# Patient Record
Sex: FEMALE | Race: WHITE | Employment: FULL TIME | URBAN - METROPOLITAN AREA
[De-identification: names, ages, dates, MRNs, and addresses within clinical notes are randomized per-mention and may not be internally consistent; named-entity substitution may affect disease eponyms.]

---

## 2017-09-14 ENCOUNTER — TELEPHONE (OUTPATIENT)
Dept: ORTHOPEDICS | Facility: CLINIC | Age: 57
End: 2017-09-14

## 2017-09-14 ENCOUNTER — TRANSFERRED RECORDS (OUTPATIENT)
Dept: HEALTH INFORMATION MANAGEMENT | Facility: CLINIC | Age: 57
End: 2017-09-14

## 2017-09-14 NOTE — TELEPHONE ENCOUNTER
Message was left for patient to call back for an earlier appointment. Appointment was offered on 10/4/17 at 4:30pm with Dr. Sanders.

## 2017-10-17 NOTE — TELEPHONE ENCOUNTER
"APPT INFORMATION    Date /Time: 10/30/17 7AM   Reason for Appt: Painful Left TKA   Ref Provider/Clinic: Mile Gunn PA-C   Patient Contact (Y/N) & Call Details: No - pt is referred   Action:  --     RECORDS CLINIC NAME  (\"None\" if no records ) RECEIVED RECS & IMG? Y/N   (may include other helpful notes)   Internal Clinics: [facility] L Knee Arthroplasty 10/31/16 op-note in Epic with Dr. Gregory Tran    [facility] [place]anne [place] PT notes are in [facility]   External Clinics: [facility] Records scanned in [facility]    XR L Knee 4/6/17 in [facility]            "

## 2017-10-30 ENCOUNTER — PRE VISIT (OUTPATIENT)
Dept: ORTHOPEDICS | Facility: CLINIC | Age: 57
End: 2017-10-30

## 2017-11-04 ASSESSMENT — ENCOUNTER SYMPTOMS
MUSCLE WEAKNESS: 1
EYE REDNESS: 0
MUSCLE CRAMPS: 0
BACK PAIN: 0
EYE PAIN: 0
DOUBLE VISION: 0
NECK PAIN: 0
EYE WATERING: 0
ARTHRALGIAS: 1
STIFFNESS: 0
EYE IRRITATION: 0
MYALGIAS: 0

## 2017-11-10 ASSESSMENT — KOOS JR: HOW SEVERE IS YOUR KNEE STIFFNESS AFTER FIRST WAKING IN MORNING: MODERATE

## 2017-11-10 ASSESSMENT — ACTIVITIES OF DAILY LIVING (ADL): FUNCTION,_DAILY_LIVING_SCORE: 64.7

## 2017-11-11 ENCOUNTER — HEALTH MAINTENANCE LETTER (OUTPATIENT)
Age: 57
End: 2017-11-11

## 2017-11-13 ENCOUNTER — OFFICE VISIT (OUTPATIENT)
Dept: ORTHOPEDICS | Facility: CLINIC | Age: 57
End: 2017-11-13

## 2017-11-13 VITALS — WEIGHT: 215.1 LBS | BODY MASS INDEX: 31.86 KG/M2 | HEIGHT: 69 IN

## 2017-11-13 DIAGNOSIS — T84.84XD PAIN DUE TO KNEE JOINT PROSTHESIS, SUBSEQUENT ENCOUNTER: ICD-10-CM

## 2017-11-13 DIAGNOSIS — Z96.659 PAIN DUE TO KNEE JOINT PROSTHESIS, SUBSEQUENT ENCOUNTER: Primary | ICD-10-CM

## 2017-11-13 DIAGNOSIS — T84.84XD PAIN DUE TO KNEE JOINT PROSTHESIS, SUBSEQUENT ENCOUNTER: Primary | ICD-10-CM

## 2017-11-13 DIAGNOSIS — Z96.659 PAIN DUE TO KNEE JOINT PROSTHESIS, SUBSEQUENT ENCOUNTER: ICD-10-CM

## 2017-11-13 LAB
APPEARANCE FLD: NORMAL
COLOR FLD: YELLOW
CRP SERPL-MCNC: <2.9 MG/L (ref 0–8)
EOSINOPHIL NFR FLD MANUAL: 1 %
ERYTHROCYTE [SEDIMENTATION RATE] IN BLOOD BY WESTERGREN METHOD: 6 MM/H (ref 0–30)
LYMPHOCYTES NFR FLD MANUAL: 37 %
MONOS+MACROS NFR FLD MANUAL: 54 %
NEUTS BAND NFR FLD MANUAL: 8 %
RBC # FLD: NORMAL /UL
SPECIMEN SOURCE FLD: NORMAL
WBC # FLD AUTO: 58 /UL

## 2017-11-13 NOTE — MR AVS SNAPSHOT
After Visit Summary   11/13/2017    Ashly Paulino    MRN: [id number]           Patient Information     Date Of Birth          1960        Visit Information        Provider Department      11/13/2017 7:00 AM Iftikhar Wu MD [facility]        Today's Diagnoses     Pain due to knee joint prosthesis, subsequent encounter    -  1       Follow-ups after your visit        Your next 10 appointments already scheduled     Nov 13, 2017  9:15 AM CST   LAB with [facility] ([facility])    [address] [id number] [phone]           Please do not eat 10-12 hours before your appointment if you are coming in fasting for labs on lipids, cholesterol, or glucose (sugar). This does not apply to pregnant women. Water, hot tea and black coffee (with nothing added) are okay. Do not drink other fluids, diet soda or chew gum.            Nov 20, 2017  8:00 AM CST   NM INJECT 3PH BONE SCAN with UUNM3   [facility], [place], Nuclear Medicine ([facility])    [address] [id number] [phone]            Nov 20, 2017  8:40 AM CST   CT LOWER EXTREMITY LEFT W/O CONTRAST with UUCT1   [facility], [place], [place] ([facility])    [address] [id number] [phone]           Please bring any scans or X-rays taken at other hospitals, if similar tests were done. Also bring a list of your medicines, including vitamins, minerals and over-the-counter drugs. It is safest to leave personal items at home.  Be sure to tell your doctor:   If you have any allergies.   If there s any chance you are pregnant.   If you are breastfeeding.   If you have any special needs.  You do not need to do anything special to prepare.  Please wear loose clothing, such as a sweat suit or jogging clothes. Avoid snaps, zippers and  other metal. We may ask you to undress and put on a hospital gown.            Nov 20, 2017 11:00 AM CST   NM BONE SCAN WHOLE BODY 3 PHASE with UUNM3   [facility], [place], Nuclear Medicine ([facility])    [address] [id number] [phone]           Please bring a list of your medicines to the exam. (Include vitamins, minerals and over-the-counter drugs.) You should wear comfortable clothes. Leave your valuables at home. Please bring related prior results and films. Tell your doctor:   If you are breastfeeding or may be pregnant.   If you have had a barium test within the past few days. Barium may change the results of certain exams.   If you think you may need sedation (medicine to help you relax).  You may eat and drink as normal.  Drink plenty of fluids and empty bladder frequently between injection and scans.              Future tests that were ordered for you today     Open Future Orders        Priority Expected Expires Ordered    CRP inflammation Routine 11/13/2017 12/13/2017 11/13/2017    Erythrocyte sedimentation rate auto Routine 11/13/2017 12/13/2017 11/13/2017    Interleukin 6 Blood Routine 11/13/2017 11/13/2018 11/13/2017    CT Lower extrem LT w/o contrast Routine  11/13/2018 11/13/2017    NM Bone 3 Phase With Whole Body Routine  11/13/2018 11/13/2017    Synovasure Periprosthetic Joint Infection Detection Routine  11/14/2018 11/13/2017            Who to contact     Please call your clinic at [phone] to:    Ask questions about your health    Make or cancel appointments    Discuss your medicines    Learn about your test results    Speak to your doctor   If you have compliments or concerns about an experience at your clinic, or if you wish to file a complaint, please contact [facility] Patient Relations at [phone] or email us at [email or web address]         Additional Information About Your  "Visit        [employer] Information     [employer] gives you secure access to your electronic health record. If you see a primary care provider, you can also send messages to your care team and make appointments. If you have questions, please call your primary care clinic.  If you do not have a primary care provider, please call [phone] and they will assist you.      [employer] is an electronic gateway that provides easy, online access to your medical records. With [employer], you can request a clinic appointment, read your test results, renew a prescription or communicate with your care team.     To access your existing account, please contact your [facility] or call [phone] for assistance.        Care EveryWhere ID     This is your Care EveryWhere ID. This could be used by other organizations to access your [place] medical records  [id number]        Your Vitals Were     Height BMI (Body Mass Index)                1.74 m (5' 8.5\") 32.23 kg/m2           Blood Pressure from Last 3 Encounters:   11/02/16 117/76   10/25/16 (!) 160/102    Weight from Last 3 Encounters:   11/13/17 97.6 kg (215 lb 1.6 oz)   10/31/16 97.5 kg (215 lb)   10/25/16 98.7 kg (217 lb 9.5 oz)              We Performed the Following     Anaerobic bacterial culture [[id number]]     Cell count with differential fluid [Dlu583]     Fluid Culture Aerobic Bacterial     Large Joint/Bursa injection and/or drainage - Unilateral (Shoulder, Knee) [34420]        Primary Care Provider Office Phone # Fax #    Ashly Montano [phone]       [address] [address]        Equal Access to Services     [facility] EDWARD AH: Hadii haylee rausch Sorichardson, waaxda luqadaha, qaybta kaalmada roseline tidwell. So [facility] [phone].    ATENCIÓN: Si habla español, tiene a clemons disposición servicios gratuitos de asistencia lingüística. Llame al [phone].    We " comply with applicable federal civil rights laws and [place] laws. We do not discriminate on the basis of race, color, national origin, age, disability, sex, sexual orientation, or gender identity.            Thank you!     Thank you for choosing [facility]  for your care. Our goal is always to provide you with excellent care. Hearing back from our patients is one way we can continue to improve our services. Please take a few minutes to complete the written survey that you may receive in the mail after your visit with us. Thank you!             Your Updated Medication List - Protect others around you: Learn how to safely use, store and throw away your medicines at www.disposemymeds.org.          This list is accurate as of: 11/13/17  9:11 AM.  Always use your most recent med list.                   Brand Name Dispense Instructions for use Diagnosis    ADVIL PO      Take 400 mg by mouth daily as needed        CELEBREX PO      Take 200 mg by mouth daily as needed        TRAMADOL HCL PO      Take 50 mg by mouth daily as needed for moderate to severe pain        TYLENOL PO      Take 650-1,300 mg by mouth daily as needed

## 2017-11-13 NOTE — NURSING NOTE
[facility]  [address] [place] [phone]  Dept: [phone]  ______________________________________________________________________________    Patient: Ashly Paulino   : 1960   MRN: [id number]   2017    INVASIVE PROCEDURE SAFETY CHECKLIST    Date: 2017   Procedure: Left Knee Aspiration   Patient Name: Ashly Paulino  MRN: [id number]  YOB: 1960    Action: Complete sections as appropriate. Any discrepancy results in a HARD COPY until resolved.     PRE PROCEDURE:  Patient ID verified with 2 identifiers (name and  or MRN): Yes  Procedure and site verified with patient/designee (when able): Yes  Accurate consent documentation in medical record: Yes  H&P (or appropriate assessment) documented in medical record: Yes  H&P must be up to 20 days prior to procedure and updates within 24 hours of procedure as applicable: Yes  Relevant diagnostic and radiology test results appropriately labeled and displayed as applicable: Yes  Procedure site(s) marked with provider initials: Yes    TIMEOUT:  Time-Out performed immediately prior to starting procedure, including verbal and active participation of all team members addressing the following:Yes  * Correct patient identify  * Confirmed that the correct side and site are marked  * An accurate procedure consent form  * Agreement on the procedure to be done  * Correct patient position  * Relevant images and results are properly labeled and appropriately displayed  * The need to administer antibiotics or fluids for irrigation purposes during the procedure as applicable   * Safety precautions based on patient history or medication use    DURING PROCEDURE: Verification of correct person, site, and procedures any time the responsibility for care of the patient is transferred to another member of the care team.

## 2017-11-13 NOTE — LETTER
11/13/2017       RE: Ashly Paulino  [address]  [place] [address]     Dear Colleague,    Thank you for referring your patient, Ashly Paulino, to the [facility]TH[facility] at [facility]. Please see a copy of my visit note below.        [facility]  Orthopaedic Surgery, Joint Replacement Consultation  by Iftikhar Wu M.D.    Ashly Paulino MRN# [id number]   Age: 57 year old YOB: 1960     Requesting physician: Mile Montano, MD Jaxson Benson JP, MD            Assessment and Plan:   Assessment:  Painful L TKA.  Unclear etiology.  Lateral side sx's suggest Patellofemoral origin.  Diff Dx:  PF imbalance, rotational malalignment femoral component, lateral retinacular tightness.       Plan:  1. CT to assess alignment  2. Aspiration to r/o infection occult. Suspicion level is low.  3. Bone scan to r/o occult loosening.  4. Gayla Carnes RN to call with results or via [employer]             History of Present Illness:   57 year old female  chief complaint    L TKA painful, since 1/2017, progressive sx's.  Lateral side based. She had bilateral LE symptoms, sudden onset while in [place] spring 2017, eventually had MRI of back and ultrasound of LE, all negative.  Due to gait disturbances, saw Dr. Humphreys at [facility], and rheumatology, for w/u, all negative.  Sx's in LE somewhat resolved.  Had EMG 3/6/17 (-).  Saw Dr. Webb, sports med [facility], went to PT for strengthening and balance and IT band work. .  5/2017 L knee pain worsens, problems with stairs going up and down. Saw Dr. Jaxson Griffin who did hip XR's (-) and KETAN Tran who advised 2nd opinion consultation.    Current symptoms:    Awakens from sleep due to sx's:  Yes  Precipitating Injury:  No    Other joints or sites painful:  Yes, as per above      Background history:  DX:  1. DJD knees    TREATMENTS:  1. 10/31/16, Left TKA, Dr. [facility], [facility]  2. 11/12/2008, [facility]  3. 5/17/12, R rotator cuff repair (Armaan Hargrove)            Physical Exam:     EXAMINATION pertinent findings:   VITAL SIGNS: Height 1.74 m (5' 8.5\"), weight 97.6 kg (215 lb 1.6 oz).  Body mass index is 32.23 kg/(m^2).  RESP: non labored breathing   ABD: benign   SKIN: grossly normal   LYMPHATIC: grossly normal   NEURO: grossly normal   VASCULAR: satisfactory perfusion of all extremities   MUSCULOSKELETAL: \  L knee:  Minimal effusion,  No warmth  0-110 deg ROM  No laxity  Quad strength 5/5  No obvious PF imbalance.             Data:   All laboratory data reviewed  All imaging studies reviewed by me        Date: 11/10/2016 Authorization Time:  8:54 AM              DATE OF PROCEDURE:  10/31/2016.       PREOPERATIVE DIAGNOSIS:  Degenerative arthrosis, left knee.       POSTOPERATIVE DIAGNOSIS:  Degenerative arthrosis, left knee.       PROCEDURE:  Left total knee arthroplasty.       SURGEON:  SALAS Tran MD       FIRST ASSISTANT:  Mile Goyal PA-C       ANESTHESIA:  Spinal with femoral block augmentation.       ESTIMATED BLOOD LOSS:  2 mL.       TOURNIQUET TIME FOR THE PROCEDURE:  63 minutes.       COMPONENTS UTILIZED:  [employer] Jasmin hyper-flexed knee, LPS, a sized 5 narrow femur, 3 base plate, 10 poly spacer, and a 35 poly button.       COMPLICATIONS ASSOCIATED/OVERALL COMORBIDITIES:  None apparent.       HISTORY:  Ashly Paulino is a 55-year-old female with ongoing end-stage tricompartment degenerative arthrosis of her left knee with a previous contralateral right knee arthroplasty with an excellent clinical result, wanted to consider the option of going forward with left knee arthroplasty after failure of conservative methods of management and marked diminution in ADL function.         Consent being obtained, the patient was taken to the operating room.  Spinal anesthesia administered, augmented with a femoral block.  Routine " prepping and draping, elevation, exsanguination.  Tourniquet then inflated to 350 mmHg pressure.  Straight line incision was made down through the soft tissues.  Patella was everted.  Had end-stage disease documented and through an intramedullary approach, a distal femoral cut was done at 9.5 resection off the lateral femoral condyle.  Anterior and posterior and chamfer cuts were done to accommodate a sized 5 femur, narrow with preparation intercondylar notch utilized a LPS component.  On the tibia, minimal resection was done with 10 mm resection of the lateral plateau.  A 3 baseplate was positioned in routine preparation after release posterior medial had been performed, and osteophytes removed from the distal femur posteriorly.  A 10 baseplate afforded no flexion extension gap and normal tracking.  The inferior patellar surface was then prepared with sharp oscillating saw to accommodate a 35 poly button.  Under pressurization technique, the tibia, femur and patella were cemented, spacer placed.  All excess cement removed after curing.  After documentation again of normal tracking, closure of the incision was done with interrupted #2 Ethibond in the deep fascia, 2-0 Vicryl in the subcutaneous tissue and staples within the skin, placement in a routine compressive dressing.  Leaving the operating facility in satisfactory condition overall, no unusual complications.           LASHAY LEONARD MD               MR SPINE LUMBAR W CONTRAST2/10/2017  [facility]  Result Narrative   Indication:  Paresthesias from the waist into both lower extremities.    Technique:  Performed with IV contrast only.     Comparison:  Noncontrast lumbar MRI 02/09/2017.    Findings:  No abnormal IV gadolinium enhancement is identified involving the conus medullaris or cauda equina.  No pathologic intradural gadolinium enhancement identified.  No evidence for abnormal gadolinium enhancement involving the lumbar  spinal column or the lumbar paraspinal soft tissues.  Alignment and multilevel disk degenerative changes as previously described.    Impression:  1. No abnormal gadolinium enhancement is identified involving the conus medullaris or cauda equina. The   2. Alignment and degenerative changes as previously described.    Dictated by Corona Van MD @ Feb 10 2017 12:53PM    Signed by Dr. Corona Van @ Feb 10 2017  2:15PM     MR SPINE CERVICAL W/WO CONTRAST2/10/2017  [facility]  Result Narrative   Indication:  Extremity paresthesias.    Technique:  Performed before and after IV gadolinium.  Contrast:  10 cc Gadavist.    Comparison:  None are available.    Findings:  Reversal of the normal cervical lordosis with slight degenerative anterolisthesis at C3-4, as well as in retrolisthesis at the C5-6 and C6-7 levels.  The posterior disc margins efface the ventral thecal sac from C3-4 through C6-7 and slightly indent the ventral surface of the cord at the C5-6 and C6-7 levels. The cord is otherwise of normal morphology, and no cord signal changes are identified.  No evidence for abnormal IV gadolinium enhancement involving the cervical spinal cord, spinal canal or spinal column.  Craniocervical junction and C1-C2:  Minor degenerative changes.  The foramen magnum is patent.  C2-3:  Bilateral facet osteoarthritis, mild right and moderate on the left.  Mild left bony foraminal narrowing.  C3-4:  Small shallow central disc extrusion migrating just above the interspace level, without lateralization.  Moderate right and severe left facet osteoarthritis.  Moderate left bony foraminal narrowing.  C4-5:  Diffuse annular bulging with mild interbody ridging.  Low-grade facet osteoarthritis and bony foraminal narrowing.  C5-6:  Advanced disc degeneration with diffuse interbody ridging and annular bulging, with shallow eccentricity posteriorly towards the right where there is slight flattening of  the right anterolateral spinal cord.  Mild facet osteoarthritis.  Moderately severe bony foraminal narrowing.  C6-7:  Moderate interspace narrowing with diffuse interbody spurring, eccentric posterolaterally on the left where there is flattening of the left anterolateral cord and  posterior displacement of the left C7 nerve root.  Mild facet osteoarthritis.  Moderately severe bony foraminal narrowing.  C7-T1:  Moderately severe bilateral facet osteoarthritis.  Some posterior annular bulging.  Low-grade foraminal narrowing.    Impression:  1. Focal left posterolateral spurring causes flattening of the left anterolateral spinal cord at C6-7. Shallow posterior spurring. Spurring causes slight flattening of the right anterolateral cord at C5-6.  No cord edema or myelomalacia identified.  No abnormal cord gadolinium enhancement.  2. At C6-7, posterolateral spurring causes focal impingement on the left C7 nerve root.   3. Multilevel bony foraminal narrowing.    Dictated by Corona Van MD @ Feb 10 2017 12:53PM    Signed by Dr. Corona Van @ Feb 10 2017  1:05PM       MR SPINE THORACIC W/WO CONTRAST2/10/2017  [facility]  Result Narrative   Indication:  Numbness and tingling in the lower.    Technique:  Performed before and after IV gadolinium contrast:  10 cc Gadavist.    Comparison:  None available.    Findings:  Thoracic alignment is anatomic.  A few small chronic Schmorl`s node endplate indentations are present.  No evidence for fracture, stress reaction or worrisome bone lesion.  The central canal is patent.  No signal changes are seen in the thoracic spinal cord, which is of normal morphology.  No evidence for abnormal IV gadolinium enhancement involving the thoracic spinal cord, spinal canal or spinal column.  Low-grade disc degenerative changes are present at several levels including variable degrees of low-grade annular bulging and anterior interbody spurring.  There is a  small shallow central disc herniation at T4-5, without lateralization.  The thoracic neural foramina are patent.    Impression:  1. No evidence for intrinsic pathology, extrinsic compression or pathologic IV gadolinium enhancement involving the thoracic spinal cord.  2. Multilevel low-grade disc degenerative changes including a shallow nonlateralizing central disc herniation at T4-5.    Dictated by Corona Van MD @ Feb 10 2017 12:52PM    Signed by Dr. Corona Van @ Feb 10 2017  1:51PM      VENOUS LOWER EXTREMITY LEFT1/17/2017  [facility]  Result Narrative   Left LOWER EXTREMITY VENOUS DOPPLER ULTRASOUND       CLINICAL INDICATIONS:  Pain and swelling.    TECHNIQUE  Multiple duplex sonographic images with doppler, color and anatomic   evaluation of the deep venous system were obtained with and without   augmentation and compression.      FINDINGS  The common femoral, superficial femoral, profunda, popliteal, posterior   tibial,  and peroneal, and contralateral common femoral veins are patent   and compressible.  There is no evidence of a deep venous thrombosis.      IMPRESSION  No evidence of deep venous thrombosis.              Iftikhar Wu MD  [facility] Family Professor  Oncology and Adult Reconstructive Surgery  Dept Orthopaedic Surgery, [facility]  [phone] office, [phone] pager  [email or web address]      DATA for DOCUMENTATION:         Past Medical History:     Patient Active Problem List   Diagnosis     History of arthroplasty of left knee     Acute pain of left knee     Effusion of lower leg joint     Adjustment reaction     Allergic rhinitis     Past Medical History:   Diagnosis Date     Adjustment disorder      Allergic rhinitis      Backache      Degenerative joint disease 2014    injections begin for left knee arthritis, TKA 10/31/16     Depression      GERD (gastroesophageal reflux disease)      Insomnia      Tinnitus      Tinnitus, unspecified  laterality      Uncomplicated asthma        Also see scanned health assessment forms.       Past Surgical History:     Past Surgical History:   Procedure Laterality Date     ARTHROPLASTY KNEE Left 10/31/2016    Procedure: ARTHROPLASTY KNEE;  Surgeon: Gregory Tran MD;  Location: [facility] OR     C SHOULDER SURG PROC UNLISTED  16    Right Rotator Cuff repair      SECTION        SECTION, IMMEDIATE HYSTERECTOMY, COMBINED      triplets     COLONOSCOPY       ENT SURGERY      septoplasty     esophogastroduodenoscopy       KNEE SURGERY  10/31/16    lateral knee discomfort begins      MYOMECTOMY UTERUS       OOPHORECTOMY Right      ORTHOPEDIC SURGERY      lt rotator cuff repair     partial lateral meniscus repair      rt     pr lateral retinacular release open       rt lateral meniscectomy and cyst removal              Social History:     Social History     Social History     Marital status:      Spouse name: N/A     Number of children: N/A     Years of education: N/A     Occupational History     Not on file.     Social History Main Topics     Smoking status: Never Smoker     Smokeless tobacco: Never Used     Alcohol use Yes      Comment: 1-2 weekly     Drug use: No     Sexual activity: No     Other Topics Concern     Not on file     Social History Narrative            Family History:       Family History   Problem Relation Age of Onset     Coronary Artery Disease Father            Coronary Artery Disease Brother            Hypertension Mother      98 years old     Hyperlipidemia Mother      98 years old     OSTEOPOROSIS Mother      (2) knees, (1) hip            Medications:     Current Outpatient Prescriptions   Medication Sig     TRAMADOL HCL PO Take 50 mg by mouth daily as needed for moderate to severe pain     Celecoxib (CELEBREX PO) Take 200 mg by mouth daily as needed      Ibuprofen (ADVIL PO) Take 400 mg by mouth daily as needed      Acetaminophen (TYLENOL PO)  Take 650-1,300 mg by mouth daily as needed      No current facility-administered medications for this visit.               Review of Systems:   A comprehensive 10 point review of systems (constitutional, ENT, cardiac, peripheral vascular, lymphatic, respiratory, GI, , Musculoskeletal, skin, Neurological) was performed and found to be negative except as described in this note.     See intake form completed by patient    Answers for HPI/ROS submitted by the patient on 11/4/2017   General Symptoms: No  Skin Symptoms: No  HENT Symptoms: No  EYE SYMPTOMS: Yes  HEART SYMPTOMS: No  LUNG SYMPTOMS: No  INTESTINAL SYMPTOMS: No  URINARY SYMPTOMS: No  GYNECOLOGIC SYMPTOMS: No  BREAST SYMPTOMS: No  SKELETAL SYMPTOMS: Yes  BLOOD SYMPTOMS: No  NERVOUS SYSTEM SYMPTOMS: No  MENTAL HEALTH SYMPTOMS: No  Eye pain: No  Vision loss: No  Dry eyes: No  Watery eyes: No  Eye bulging: No  Double vision: No  Flashing of lights: No  Spots: No  Floaters: Yes  Redness: No  Crossed eyes: No  Tunnel Vision: No  Yellowing of eyes: No  Eye irritation: No  Back pain: No  Muscle aches: No  Neck pain: No  Joint pain: Yes  Bone pain: No  Muscle cramps: No  Muscle weakness: Yes  Joint stiffness: No  Bone fracture: No      Again, thank you for allowing me to participate in the care of your patient.      Sincerely,    Iftikhar Wu MD

## 2017-11-13 NOTE — NURSING NOTE
"Chief Complaint   Patient presents with     Consult     Pt states that she is here today for Painful Left Lat. TKA done by KETAN Tran, 10/31/16. Referring:  JOSE NICK WILLSON       57 year old  1960    Ht 1.74 m (5' 8.5\")  Wt 97.6 kg (215 lb 1.6 oz)  BMI 32.23 kg/m2      Date/Surgery/Surgeon/Hospital:  1. 10/31/16, Left TKA, Dr. KETAN Tran, [facility]  2. 9yrs ago, Right TKA, Dr. KETAN Tran, [facility] [id number] [place] [address] [employer]    Allergies   Allergen Reactions     Benadryl [Diphenhydramine] Swelling     throat     Morphine Other (See Comments)     Respiratory rate dropped seriously     Omnipaque [Iohexol] Other (See Comments)     Serious severe headache, delayed response but brought her to ER     Soap Hives     Nine years ago with surgery, does OK with hibiclens.     Augmentin Rash     Current Outpatient Prescriptions   Medication     TRAMADOL HCL PO     Celecoxib (CELEBREX PO)     Ibuprofen (ADVIL PO)     Acetaminophen (TYLENOL PO)     No current facility-administered medications for this visit.          Questionnaires:      KOOS Knee Survey Assessment    Knee Outcome Survey ADL Scale (Willy, CARLITOS; Matthew, L; Maria Guadalupe, RS; Jose Cruz, FH; Laurita, RUPERTO; 1998) 11/10/2017   Pain (ADLS1) -   Stiffness (ADLS2) -   Swelling (ADLS3) -   Giving Way, Buckling or Shifting of Knee (ADLS4) -   Weakness (ADLS5) -   Limping (ADLS6) -   Walk? (ADLS7) -   Go up stairs? (ADLS8) -   Go down stairs? (ADLS9) -   Stand? (ADLS10) -   Kneel on the front of your knee? (ADLS11) -   Squat? (ADLS12) -   Sit with your knee bent? (ADLS13) -   How would you rate the current function of your knee during your usual daily activities on a scale from 0 to 100 with 100 being your level of knee function prior to your injury and 0 being the inability to perform any of your usual daily activities? -   How would you rate the overall function of your knee during your usual daily activities?  " (please check the one response that best describes you) -   As a result of your knee injury, how would you rate your current level of daily activity? (please check the one response that best describes you) -   Sum -   Count -   Raw Score -   Knee Activity of Daily Living Score -   Do you have swelling in your knee? Sometimes   Do you feel grinding, hear clicking or any other type of noise when your knee moves? Always   Does your knee catch or hang up when moving? Often   Can you straighten your knee fully? Always   Can you bend your knee fully? Always   How severe is your knee joint stiffness after first wakening in the morning? Moderate   How severe is your knee stiffness after sitting, lying or resting LATER IN THE DAY? Moderate   How often do you experience knee pain? Daily   Twisting/pivoting on your knee Severe   Straightening knee fully Mild   Bending knee fully Mild   Walking on flat surface Mild   Going up or down stairs Severe   At night while in bed Severe   Sitting or lying None   Standing upright None   Descending stairs Severe   Ascending stairs Severe   Rising from sitting Mild   Standing None   Bending to floor/ an object Mild   Walking on flat surface Mild   Getting in/out of car Mild   Going shopping Mild   Putting on socks/stockings Moderate   Rising from bed Moderate   Taking off socks/stockings Moderate   Lying in bed (turning over, maintaining knee position) Severe   Getting in/out of bath Mild   Sitting None   Getting on/off toilet Mild   Heavy domestic duties (moving heavy boxes, scrubbing floors, etc) Moderate   Light domestic duties (cooking, dusting, etc) None   Squatting Moderate   Running Extreme   Jumping Extreme   Twisting/pivoting on your injured knee Severe   Kneeling Moderate   How often are you aware of your knee problem? Constantly   Have you modified your life style to avoid potentially damaging activities to your knee? Moderately   How much are you troubled with lack of  confidence in your knee? Severely   In general, how much difficulty do you have with your knee? Severe   Pain Score 58.33   Symptoms Score 25   Function, Daily Living Score 64.7   Sports and Rec Score 25   Quality of Life Score 25            Promis 10 Assessment    PROMIS 10 11/10/2017   In general, would you say your health is: Good   In general, would you say your quality of life is: Good   In general, how would you rate your physical health? Good   In general, how would you rate your mental health, including your mood and your ability to think? Very good   In general, how would you rate your satisfaction with your social activities and relationships? Good   In general, please rate how well you carry out your usual social activities and roles Good   To what extent are you able to carry out your everyday physical activities such as walking, climbing stairs, carrying groceries, or moving a chair? Mostly   How often have you been bothered by emotional problems such as feeling anxious, depressed or irritable? Rarely   How would you rate your fatigue on average? Moderate   How would you rate your pain on average?   0 = No Pain  to  10 = Worst Imaginable Pain 3   In general, would you say your health is: 3   In general, would you say your quality of life is: 3   In general, how would you rate your physical health? 3   In general, how would you rate your mental health, including your mood and your ability to think? 4   In general, how would you rate your satisfaction with your social activities and relationships? 3   In general, please rate how well you carry out your usual social activities and roles. (This includes activities at home, at work and in your community, and responsibilities as a parent, child, spouse, employee, friend, etc.) 3   To what extent are you able to carry out your everyday physical activities such as walking, climbing stairs, carrying groceries, or moving a chair? 4   In the past 7 days, how often  "have you been bothered by emotional problems such as feeling anxious, depressed, or irritable? 2   In the past 7 days, how would you rate your fatigue on average? 3   In the past 7 days, how would you rate your pain on average, where 0 means no pain, and 10 means worst imaginable pain? 3   Global Mental Health Score 14   Global Physical Health Score 14   PROMIS TOTAL - SUBSCORES 28   Some recent data might be hidden            Ortho Oxford Knee Questionnaire    Oxford Knee Score (  [employer], 1998. All rights reserved) - Problems with knee during last 4 Weeks 11/4/2017   1.  How would you describe the pain you usually have from your knee? Moderate   2.  Have you had any trouble with washing and drying yourself (all over) because of your knee? No trouble at all   3.  Have you had any trouble getting in and out of a car or using public transportation because of your knee? (whichever you would tend to use) Very little trouble   4.  For how long have you been able to walk before pain from you knee becomes severe? (with or without cane) No pain/more than 30 minutes   5.  After a meal (sitting at a table), how painful has it been for you to stand up from a chair because of your knee? Not at all painful   6.  Have you been limping when walking because of your knee? Rarely/never   7.  Could you kneel down and get up again afterwards? With moderate difficulty   8.  Have you been troubled by pain from your knee in bed at night? Every night   9.  How much has pain from your knee interfered with your usual work (including housework)? A little bit   10. Have you felt that your knee might suddenly \"give out\" or let you down? Sometimes, or just at first   11. Could you do the grocery shopping on your own? Yes easily   12. Could you walk down one flight of stairs? With moderate difficulty   OXFORD TOTAL SCORE 34                  Kim Nieto C.M.A.       "

## 2017-11-14 LAB — IL6 SERPL-MCNC: 2.53 PG/ML

## 2017-11-18 LAB
BACTERIA SPEC CULT: NO GROWTH
SPECIMEN SOURCE: NORMAL

## 2017-11-20 ENCOUNTER — HOSPITAL ENCOUNTER (OUTPATIENT)
Dept: NUCLEAR MEDICINE | Facility: CLINIC | Age: 57
Setting detail: NUCLEAR MEDICINE
End: 2017-11-20
Attending: ORTHOPAEDIC SURGERY
Payer: COMMERCIAL

## 2017-11-20 ENCOUNTER — HOSPITAL ENCOUNTER (OUTPATIENT)
Dept: CT IMAGING | Facility: CLINIC | Age: 57
Discharge: HOME | End: 2017-11-20
Attending: ORTHOPAEDIC SURGERY | Admitting: ORTHOPAEDIC SURGERY
Payer: COMMERCIAL

## 2017-11-20 DIAGNOSIS — Z96.659 PAIN DUE TO KNEE JOINT PROSTHESIS, SUBSEQUENT ENCOUNTER: ICD-10-CM

## 2017-11-20 DIAGNOSIS — T84.84XD PAIN DUE TO KNEE JOINT PROSTHESIS, SUBSEQUENT ENCOUNTER: ICD-10-CM

## 2017-11-20 LAB — SYNOVASURE PERIPROSTHETIC JOINT INFECTION DECTION: NORMAL

## 2017-11-20 PROCEDURE — 34300033 ZZH RX 343: Performed by: ORTHOPAEDIC SURGERY

## 2017-11-20 PROCEDURE — 78315 BONE IMAGING 3 PHASE: CPT

## 2017-11-20 PROCEDURE — A9503 TC99M MEDRONATE: HCPCS | Performed by: ORTHOPAEDIC SURGERY

## 2017-11-20 RX ORDER — TC 99M MEDRONATE 20 MG/10ML
20-30 INJECTION, POWDER, LYOPHILIZED, FOR SOLUTION INTRAVENOUS ONCE
Status: COMPLETED | OUTPATIENT
Start: 2017-11-20 | End: 2017-11-20

## 2017-11-20 RX ADMIN — TC 99M MEDRONATE 27 MCI.: 20 INJECTION, POWDER, LYOPHILIZED, FOR SOLUTION INTRAVENOUS at 07:52

## 2017-11-21 NOTE — PROGRESS NOTES
Ashly, I reviewed the bone scan and CT scan of your left knee. The CT scan shows no evidence of any significant malalignment problem and it shows no evidence of loosening of the device. However the bone scan does note some increased uptake that probably correlates with the symptoms you are experiencing. This raises the question of whether or not there is subtle evidence of loosening of the device present. It is impossible to know with certainty and I would advise that we watch and monitor your x-rays over time. If your implant truly is loose, then eventually we will see evidence of it on the x-rays in which case we would intervene at that time.      Iftikhar Wu MD  11/20/2017  6:01 PM

## 2017-11-27 LAB
BACTERIA SPEC CULT: NORMAL
SPECIMEN SOURCE: NORMAL

## 2017-11-30 NOTE — PROGRESS NOTES
Ashly, I reviewed all of your blood tests as well as a CT scan and bone scan. The blood tests show no evidence of an infectious process present in your left knee replacement. The bone scan is suspicious for possible loosening of the femoral component however the CT scan does not confirm this at all. No other abnormalities or findings are present that would account for your symptoms.    Therefore, I'm uncertain of the exact cause of your symptoms. There is no clear diagnosis from the testing that would justify proceeding with surgery or exploration in my opinion. I advise that we watch you over the course of the next year and if there is loosening of the device, eventually it will show up on either an x-ray or CT scan. At that point, surgical intervention would be advisable. To so would earlier suggest subject to the risk of the procedure and possible infection without a predictable expectation of a benefit.     Iftikhar Wu MD  11/30/2017  10:59 AM

## 2018-01-18 ENCOUNTER — TRANSFERRED RECORDS (OUTPATIENT)
Dept: HEALTH INFORMATION MANAGEMENT | Facility: CLINIC | Age: 58
End: 2018-01-18

## 2018-09-18 ENCOUNTER — THERAPY VISIT (OUTPATIENT)
Dept: PHYSICAL THERAPY | Facility: CLINIC | Age: 58
End: 2018-09-18
Payer: COMMERCIAL

## 2018-09-18 DIAGNOSIS — M25.562 ACUTE PAIN OF LEFT KNEE: Primary | ICD-10-CM

## 2018-09-18 DIAGNOSIS — Z96.652 STATUS POST TOTAL LEFT KNEE REPLACEMENT: ICD-10-CM

## 2018-09-18 PROCEDURE — 97161 PT EVAL LOW COMPLEX 20 MIN: CPT | Mod: GP

## 2018-09-18 PROCEDURE — 97110 THERAPEUTIC EXERCISES: CPT | Mod: GP

## 2018-09-18 ASSESSMENT — ACTIVITIES OF DAILY LIVING (ADL)
RISE FROM A CHAIR: ACTIVITY IS NOT DIFFICULT
KNEE_ACTIVITY_OF_DAILY_LIVING_SCORE: 82.86
WALK: ACTIVITY IS NOT DIFFICULT
HOW_WOULD_YOU_RATE_THE_OVERALL_FUNCTION_OF_YOUR_KNEE_DURING_YOUR_USUAL_DAILY_ACTIVITIES?: NEARLY NORMAL
AS_A_RESULT_OF_YOUR_KNEE_INJURY,_HOW_WOULD_YOU_RATE_YOUR_CURRENT_LEVEL_OF_DAILY_ACTIVITY?: NEARLY NORMAL
STIFFNESS: I DO NOT HAVE THE SYMPTOM
PAIN: THE SYMPTOM AFFECTS MY ACTIVITY SLIGHTLY
KNEEL ON THE FRONT OF YOUR KNEE: ACTIVITY IS SOMEWHAT DIFFICULT
SQUAT: ACTIVITY IS SOMEWHAT DIFFICULT
GO UP STAIRS: ACTIVITY IS MINIMALLY DIFFICULT
GO DOWN STAIRS: ACTIVITY IS SOMEWHAT DIFFICULT
WEAKNESS: I HAVE THE SYMPTOM BUT IT DOES NOT AFFECT MY ACTIVITY
HOW_WOULD_YOU_RATE_THE_CURRENT_FUNCTION_OF_YOUR_KNEE_DURING_YOUR_USUAL_DAILY_ACTIVITIES_ON_A_SCALE_FROM_0_TO_100_WITH_100_BEING_YOUR_LEVEL_OF_KNEE_FUNCTION_PRIOR_TO_YOUR_INJURY_AND_0_BEING_THE_INABILITY_TO_PERFORM_ANY_OF_YOUR_USUAL_DAILY_ACTIVITIES?: 75
KNEE_ACTIVITY_OF_DAILY_LIVING_SUM: 58
SIT WITH YOUR KNEE BENT: ACTIVITY IS NOT DIFFICULT
RAW_SCORE: 58
STAND: ACTIVITY IS NOT DIFFICULT
LIMPING: I DO NOT HAVE THE SYMPTOM
GIVING WAY, BUCKLING OR SHIFTING OF KNEE: I HAVE THE SYMPTOM BUT IT DOES NOT AFFECT MY ACTIVITY
SWELLING: I HAVE THE SYMPTOM BUT IT DOES NOT AFFECT MY ACTIVITY

## 2018-09-18 NOTE — MR AVS SNAPSHOT
After Visit Summary   9/18/2018    Ashly Paulino    MRN: [id number]           Patient Information     Date Of Birth          1960        Visit Information        Provider Department      9/18/2018 9:50 AM Juan Chaidez PT [place] [facility]        Today's Diagnoses     Acute pain of left knee    -  1    Status post total left knee replacement           Follow-ups after your visit        Who to contact     If you have questions or need follow up information about today's clinic visit or your schedule please contact [facility] directly at [phone].  Normal or non-critical lab and imaging results will be communicated to you by Joinnushart, letter or phone within 4 business days after the clinic has received the results. If you do not hear from us within 7 days, please contact the clinic through Joinnushart or phone. If you have a critical or abnormal lab result, we will notify you by phone as soon as possible.  Submit refill requests through [employer] or call your pharmacy and they will forward the refill request to us. Please allow 3 business days for your refill to be completed.          Additional Information About Your Visit        MyChart Information     [employer] gives you secure access to your electronic health record. If you see a primary care provider, you can also send messages to your care team and make appointments. If you have questions, please call your primary care clinic.  If you do not have a primary care provider, please call [phone] and they will assist you.        Care EveryWhere ID     This is your Care EveryWhere ID. This could be used by other organizations to access your [place] medical records  [id number]         Blood Pressure from Last 3 Encounters:   11/02/16 117/76   10/25/16 (!) 160/102    Weight from Last 3 Encounters:   11/13/17 97.6 kg (215 lb 1.6 oz)   10/31/16  97.5 kg (215 lb)   10/25/16 98.7 kg (217 lb 9.5 oz)              We Performed the Following     HC PT EVAL, LOW COMPLEXITY     THERAPEUTIC EXERCISES        Primary Care Provider Office Phone # Fax #    Ashly Montano [phone]       [facility] [address] [place] [id number] JAGDEEP [facility] [address]  [place] [address]        Equal Access to Services     [facility] : Hadii aad ku hadasho Soomaali, waaxda luqadaha, qaybta kaalmada adeegyada, waxay idiin hayaan adeeg kharash la'aan . So [facility] [phone].    ATENCIÓN: Si yamilet macias, tiene a clemons disposición servicios gratuitos de asistencia lingüística. Rizwanaame al [phone].    We comply with applicable federal civil rights laws and [place] laws. We do not discriminate on the basis of race, color, national origin, age, disability, sex, sexual orientation, or gender identity.            Thank you!     Thank you for [facility]  for your care. Our goal is always to provide you with excellent care. Hearing back from our patients is one way we can continue to improve our services. Please take a few minutes to complete the written survey that you may receive in the mail after your visit with us. Thank you!             Your Updated Medication List - Protect others around you: Learn how to safely use, store and throw away your medicines at www.disposemymeds.org.          This list is accurate as of 9/18/18  2:46 PM.  Always use your most recent med list.                   Brand Name Dispense Instructions for use Diagnosis    ADVIL PO      Take 400 mg by mouth daily as needed        CELEBREX PO      Take 200 mg by mouth daily as needed        TRAMADOL HCL PO      Take 50 mg by mouth daily as needed for moderate to severe pain        TYLENOL PO      Take 650-1,300 mg by mouth daily as needed

## 2018-09-18 NOTE — PROGRESS NOTES
[place] [facility] Initial Evaluation  Subjective:  Patient is a 57 year old female presenting with rehab left knee hpi.   Ashly Paulino is a 57 year old female with a left knee condition.  Condition occurred with:  Other reason.  Condition occurred: other.  This is a recurrent condition  See uploaded document for complete health history of knee beginning January 2017.  .    Patient reports pain:  In the joint.  Radiates to:  Knee.  Pain is described as burning and is intermittent and reported as 2/10.  Associated symptoms:  Buckling/giving out, numbness and tingling.   Symptoms are exacerbated by activity, ascending stairs, bending/squatting and certain positions and relieved by nothing.  Since onset symptoms are gradually worsening.                                                      Objective:  System                                           Hip Evaluation    Hip Strength:      Extension:  Left: 4-/5  Pain:Right: 5-/5    Pain:    Abduction:  Left: 4-/5     Pain:Right: 5-/5    Pain:                           Knee Evaluation:  ROM:    AROM    Hyperextension: Left:  0    Right:  Extension: Left: 0    Right:   Flexion: Left: 125   Right:              Palpation:  Palpation of knee: Moderate crepitus noted with L knee flex/ext.      Edema:  Normal            General     ROS    Assessment/Plan:    Patient is a 57 year old female with left side knee complaints.    Patient has the following significant findings with corresponding treatment plan.                Diagnosis 1:  S/P history of L TKA with pain and weakness and soreness  Pain -  self management, education and home program  Decreased strength - therapeutic exercise and therapeutic activities  Impaired muscle performance - neuro re-education  Decreased function - therapeutic activities    Therapy Evaluation Codes:   1) History comprised of:   Personal factors that impact the plan of care:      None.    Comorbidity factors that impact the plan  of care are:      None.     Medications impacting care: None.  2) Examination of Body Systems comprised of:   Body structures and functions that impact the plan of care:      Knee.   Activity limitations that impact the plan of care are:      Walking.  3) Clinical presentation characteristics are:   Evolving/Changing.  4) Decision-Making    Moderate complexity using standardized patient assessment instrument and/or measureable assessment of functional outcome.  Cumulative Therapy Evaluation is: Moderate complexity.    Previous and current functional limitations:  (See Goal Flow Sheet for this information)    Short term and Long term goals: (See Goal Flow Sheet for this information)     Communication ability:  Patient appears to be able to clearly communicate and understand verbal and written communication and follow directions correctly.  Treatment Explanation - The following has been discussed with the patient:   RX ordered/plan of care  Anticipated outcomes  Possible risks and side effects  This patient would benefit from PT intervention to resume normal activities.   Rehab potential is fair.    Frequency:  1 X week, once daily  Duration:  for 8 weeks  Discharge Plan:  Achieve all LTG.  Independent in home treatment program.  Reach maximal therapeutic benefit.    Please refer to the daily flowsheet for treatment today, total treatment time and time spent performing 1:1 timed codes.

## 2018-09-18 NOTE — LETTER
[facility]  [address] [place] [address]  [address]  [facility] [id number] [phone]    2018    Re: Ashly Paulino   :   1960  MRN:  [id number]   REFERRING PHYSICIAN:   Ashly Montano    [facility]    Date of Initial Evaluation:  18   Visits:  Rxs Used: 1  Reason for Referral:     Acute pain of left knee  Status post total left knee replacement    EVALUATION SUMMARY    [facility]    Subjective:    Patient is a 57 year old female presenting with rehab left knee hpi.   Ashly Paulino is a 57 year old female with a left knee condition.  Condition occurred with:  Other reason.  Condition occurred: other.  This is a recurrent condition  See uploaded document for complete health history of knee beginning 2017.  .    Patient reports pain:  In the joint.  Radiates to:  Knee.  Pain is described as burning and is intermittent and reported as 2/10.  Associated symptoms:  Buckling/giving out, numbness and tingling.   Symptoms are exacerbated by activity, ascending stairs, bending/squatting and certain positions and relieved by nothing.  Since onset symptoms are gradually worsening.                        Pertinent medical history includes:  Asthma, depression, high blood pressure, implanted devices, menopausal, osteoarthritis and overweight.  Medical allergies: yes (Latex, amoxicillan, Augmentine,Morphine ).    Current medications:  High blood pressure medication.          Objective:     Hip Evaluation  Hip Strength:    Extension:  Left: 4-/5  Pain:Right: 5-/5    Pain:    Abduction:  Left: 4-/5     Pain:Right: 5-/5    Pain:  Knee Evaluation:  ROM:    AROM  Hyperextension: Left:  0    Right:  Extension: Left: 0    Right:   Flexion: Left: 125   Right:    Re: Ashly Paulino   :   1960        Palpation:  Palpation of knee:  Moderate crepitus noted with L knee flex/ext.  Edema:  Normal    Assessment/Plan:      Patient is a 57 year old female with left side knee complaints.    Patient has the following significant findings with corresponding treatment plan.                Diagnosis 1:  S/P history of L TKA with pain and weakness and soreness  Pain -  self management, education and home program  Decreased strength - therapeutic exercise and therapeutic activities  Impaired muscle performance - neuro re-education  Decreased function - therapeutic activities    Therapy Evaluation Codes:   1) History comprised of:   Personal factors that impact the plan of care:      None.    Comorbidity factors that impact the plan of care are:      None.     Medications impacting care: None.  2) Examination of Body Systems comprised of:   Body structures and functions that impact the plan of care:      Knee.   Activity limitations that impact the plan of care are:      Walking.  3) Clinical presentation characteristics are:   Evolving/Changing.  4) Decision-Making    Moderate complexity using standardized patient assessment instrument and/or measureable assessment of functional outcome.  Cumulative Therapy Evaluation is: Moderate complexity.  Previous and current functional limitations:  (See Goal Flow Sheet for this information)    Short term and Long term goals: (See Goal Flow Sheet for this information)   Communication ability:  Patient appears to be able to clearly communicate and understand verbal and written communication and follow directions correctly.  Treatment Explanation - The following has been discussed with the patient:   RX ordered/plan of care  Anticipated outcomes  Possible risks and side effects  This patient would benefit from PT intervention to resume normal activities.   Rehab potential is fair.                Re: Ashly Paulino   :   1960          Frequency:  1 X week, once daily  Duration:  for 8 weeks  Discharge Plan:   Achieve all LTG.  Independent in home treatment program.  Reach maximal therapeutic benefit.      Thank you for your referral.    INQUIRIES  Therapist: Juan Chaidez PT   [facility]  [address] [place] [address]  [address]  [facility] [address]  Phone: [phone]  Fax: [phone]

## 2018-09-19 NOTE — PROGRESS NOTES
[place] [facility] Initial Evaluation  Subjective:  Patient is a 57 year old female presenting with rehab left ankle/foot hpi.                                      Pertinent medical history includes:  Asthma, depression, high blood pressure, implanted devices, menopausal, osteoarthritis and overweight.  Medical allergies: yes (Latex, amoxicillan, Augmentine,Morphine ).    Current medications:  High blood pressure medication.                         Knee Activity of Daily Living Score: 82.86            Objective:  System    Physical Exam    General     ROS    Assessment/Plan:

## 2018-10-30 ENCOUNTER — THERAPY VISIT (OUTPATIENT)
Dept: PHYSICAL THERAPY | Facility: CLINIC | Age: 58
End: 2018-10-30
Payer: COMMERCIAL

## 2018-10-30 DIAGNOSIS — Z96.652 STATUS POST TOTAL LEFT KNEE REPLACEMENT: ICD-10-CM

## 2018-10-30 DIAGNOSIS — M25.562 ACUTE PAIN OF LEFT KNEE: ICD-10-CM

## 2018-10-30 PROCEDURE — 97530 THERAPEUTIC ACTIVITIES: CPT | Mod: GP

## 2018-10-30 PROCEDURE — 97110 THERAPEUTIC EXERCISES: CPT | Mod: GP

## 2019-07-01 ENCOUNTER — DOCUMENTATION ONLY (OUTPATIENT)
Dept: CARE COORDINATION | Facility: CLINIC | Age: 59
End: 2019-07-01

## 2019-07-12 DIAGNOSIS — Z96.659 PAIN DUE TO KNEE JOINT PROSTHESIS, SUBSEQUENT ENCOUNTER: Primary | ICD-10-CM

## 2019-07-12 DIAGNOSIS — T84.84XD PAIN DUE TO KNEE JOINT PROSTHESIS, SUBSEQUENT ENCOUNTER: Primary | ICD-10-CM

## 2019-07-15 ENCOUNTER — ANCILLARY PROCEDURE (OUTPATIENT)
Dept: CT IMAGING | Facility: CLINIC | Age: 59
End: 2019-07-15
Attending: ORTHOPAEDIC SURGERY
Payer: COMMERCIAL

## 2019-07-15 ENCOUNTER — ANCILLARY PROCEDURE (OUTPATIENT)
Dept: GENERAL RADIOLOGY | Facility: CLINIC | Age: 59
End: 2019-07-15
Attending: ORTHOPAEDIC SURGERY
Payer: COMMERCIAL

## 2019-07-15 ENCOUNTER — OFFICE VISIT (OUTPATIENT)
Dept: ORTHOPEDICS | Facility: CLINIC | Age: 59
End: 2019-07-15
Payer: COMMERCIAL

## 2019-07-15 VITALS — BODY MASS INDEX: 32.32 KG/M2 | WEIGHT: 218.2 LBS | HEIGHT: 69 IN

## 2019-07-15 DIAGNOSIS — Z96.659 PAIN DUE TO KNEE JOINT PROSTHESIS, SUBSEQUENT ENCOUNTER: ICD-10-CM

## 2019-07-15 DIAGNOSIS — Z96.659 PAIN DUE TO KNEE JOINT PROSTHESIS, SUBSEQUENT ENCOUNTER: Primary | ICD-10-CM

## 2019-07-15 DIAGNOSIS — T84.84XD PAIN DUE TO KNEE JOINT PROSTHESIS, SUBSEQUENT ENCOUNTER: ICD-10-CM

## 2019-07-15 DIAGNOSIS — T84.84XD PAIN DUE TO KNEE JOINT PROSTHESIS, SUBSEQUENT ENCOUNTER: Primary | ICD-10-CM

## 2019-07-15 RX ORDER — LOSARTAN POTASSIUM 50 MG/1
75 TABLET ORAL EVERY MORNING
COMMUNITY
Start: 2019-03-13

## 2019-07-15 RX ORDER — ALBUTEROL SULFATE 90 UG/1
1-2 AEROSOL, METERED RESPIRATORY (INHALATION) PRN
COMMUNITY
Start: 2018-07-19

## 2019-07-15 RX ORDER — FEXOFENADINE HCL AND PSEUDOEPHEDRINE HCI 60; 120 MG/1; MG/1
1 TABLET, EXTENDED RELEASE ORAL PRN
COMMUNITY
Start: 2011-09-27

## 2019-07-15 ASSESSMENT — ENCOUNTER SYMPTOMS
SMELL DISTURBANCE: 0
DOUBLE VISION: 0
SINUS CONGESTION: 0
JOINT SWELLING: 1
EYE WATERING: 0
EYE PAIN: 0
MUSCLE CRAMPS: 0
SINUS PAIN: 0
MUSCLE WEAKNESS: 1
NECK MASS: 0
MYALGIAS: 0
TASTE DISTURBANCE: 0
STIFFNESS: 1
TROUBLE SWALLOWING: 0
BACK PAIN: 0
NECK PAIN: 0
SORE THROAT: 0
HOARSE VOICE: 0
ARTHRALGIAS: 1

## 2019-07-15 ASSESSMENT — ACTIVITIES OF DAILY LIVING (ADL): FUNCTION,_DAILY_LIVING_SCORE: 80.88

## 2019-07-15 ASSESSMENT — MIFFLIN-ST. JEOR: SCORE: 1624.13

## 2019-07-15 NOTE — NURSING NOTE
"Chief Complaint   Patient presents with     Consult     Pt. states that she is here today for Painful Left TKA. She states that she began exp. pain about 2 months after.        58 year old  1960    Ht 1.753 m (5' 9\")   Wt 99 kg (218 lb 3.2 oz)   BMI 32.22 kg/m        [facility] [address] [facility] [employer] - [facility] [address]    Allergies   Allergen Reactions     Benadryl [Diphenhydramine] Swelling     throat     Morphine Other (See Comments)     Respiratory rate dropped seriously     Omnipaque [Iohexol] Other (See Comments)     Serious severe headache, delayed response but brought her to ER     Soap Hives     Nine years ago with surgery, does OK with hibiclens.     Augmentin Rash       Current Outpatient Medications   Medication     Acetaminophen (TYLENOL PO)     albuterol (PROVENTIL HFA) 108 (90 Base) MCG/ACT inhaler     Celecoxib (CELEBREX PO)     fexofenadine-pseudoePHEDrine (ALLEGRA-D)  MG 12 hr tablet     Ibuprofen (ADVIL PO)     losartan (COZAAR) 50 MG tablet     No current facility-administered medications for this visit.          Questionnaires:        KOOS Knee Survey Assessment    Knee Outcome Survey ADL Scale (Willy, CARLITOS; Matthew, L; Maria Guadalupe, RS; Jose Cruz, FH; Laurita, CD; 1998) 7/15/2019   Pain (ADLS1) -   Stiffness (ADLS2) -   Swelling (ADLS3) -   Giving Way, Buckling or Shifting of Knee (ADLS4) -   Weakness (ADLS5) -   Limping (ADLS6) -   Walk? (ADLS7) -   Go up stairs? (ADLS8) -   Go down stairs? (ADLS9) -   Stand? (ADLS10) -   Kneel on the front of your knee? (ADLS11) -   Squat? (ADLS12) -   Sit with your knee bent? (ADLS13) -   How would you rate the current function of your knee during your usual daily activities on a scale from 0 to 100 with 100 being your level of knee function prior to your injury and 0 being the inability to perform any of your usual daily activities? -   How would you rate the overall function of your knee during your usual daily activities?  (please check " the one response that best describes you) -   As a result of your knee injury, how would you rate your current level of daily activity? (please check the one response that best describes you) -   Sum -   Count -   Raw Score -   Knee Activity of Daily Living Score -   Do you have swelling in your knee? Sometimes   Do you feel grinding, hear clicking or any other type of noise when your knee moves? Often   Does your knee catch or hang up when moving? Never   Can you straighten your knee fully? Always   Can you bend your knee fully? Always   How severe is your knee joint stiffness after first wakening in the morning? None   How severe is your knee stiffness after sitting, lying or resting LATER IN THE DAY? None   How often do you experience knee pain? Daily   Twisting/pivoting on your knee None   Straightening knee fully None   Bending knee fully None   Walking on flat surface Mild   Going up or down stairs Moderate   At night while in bed Moderate   Sitting or lying Moderate   Standing upright None   Descending stairs Moderate   Ascending stairs Moderate   Rising from sitting None   Standing None   Bending to floor/ an object Mild   Walking on flat surface Mild   Getting in/out of car None   Going shopping Mild   Putting on socks/stockings None   Rising from bed None   Taking off socks/stockings None   Lying in bed (turning over, maintaining knee position) Severe   Getting in/out of bath Mild   Sitting None   Getting on/off toilet None   Heavy domestic duties (moving heavy boxes, scrubbing floors, etc) Moderate   Light domestic duties (cooking, dusting, etc) None   Squatting Moderate   Running Extreme   Jumping Extreme   Twisting/pivoting on your injured knee Moderate   Kneeling Extreme   How often are you aware of your knee problem? Daily   Have you modified your life style to avoid potentially damaging activities to your knee? Moderately   How much are you troubled with lack of confidence in your knee?  Severely   In general, how much difficulty do you have with your knee? Severe   Pain Score 72.22   Symptoms Score 53.57   Function, Daily Living Score 80.88   Sports and Rec Score 20   Quality of Life Score 31.25              Promis 10 Assessment    PROMIS 10 7/15/2019   In general, would you say your health is: Fair   In general, would you say your quality of life is: Good   In general, how would you rate your physical health? Fair   In general, how would you rate your mental health, including your mood and your ability to think? Good   In general, how would you rate your satisfaction with your social activities and relationships? Good   In general, please rate how well you carry out your usual social activities and roles Very good   To what extent are you able to carry out your everyday physical activities such as walking, climbing stairs, carrying groceries, or moving a chair? Completely   How often have you been bothered by emotional problems such as feeling anxious, depressed or irritable? Never   How would you rate your fatigue on average? Moderate   How would you rate your pain on average?   0 = No Pain  to  10 = Worst Imaginable Pain 4   In general, would you say your health is: 2   In general, would you say your quality of life is: 3   In general, how would you rate your physical health? 2   In general, how would you rate your mental health, including your mood and your ability to think? 3   In general, how would you rate your satisfaction with your social activities and relationships? 3   In general, please rate how well you carry out your usual social activities and roles. (This includes activities at home, at work and in your community, and responsibilities as a parent, child, spouse, employee, friend, etc.) 4   To what extent are you able to carry out your everyday physical activities such as walking, climbing stairs, carrying groceries, or moving a chair? 5   In the past 7 days, how often have you been  bothered by emotional problems such as feeling anxious, depressed, or irritable? 1   In the past 7 days, how would you rate your fatigue on average? 3   In the past 7 days, how would you rate your pain on average, where 0 means no pain, and 10 means worst imaginable pain? 4   Global Mental Health Score 14   Global Physical Health Score 13   PROMIS TOTAL - SUBSCORES 27   Some recent data might be hidden

## 2019-07-15 NOTE — LETTER
7/15/2019       RE: Ashly Paulino  [address]  [place] [address]     Dear Colleague,    Thank you for referring your patient, Ashly Paulino, to the [facility]. Please see a copy of my visit note below.    [facility]  Orthopaedic Surgery, Joint Replacement Consultation  by Iftikhar Wu M.D.    Ashly Paulino MRN# [id number]   Age: 58 year old YOB: 1960     Requesting physician: Referred Self  Ashly Montano          Assessment and Plan:   Assessment:  Painful L TKA.  Unclear etiology.  Lateral side sx's suggest Patellofemoral origin.  Diff Dx:  PF imbalance, rotational malalignment femoral component, lateral retinacular tightness.       Plan:  1. Repeat CT scan of the left knee to evaluate for patellar component loosening and patellar cyst. If there is evidence of patellar loosening or cyst growth, then we will proceed with a revision. If there is no change on CT scan I will refer the patient to Dr. Dejesus for evaluation of patellar subluxation of subtle nature and consideration of patellar femoral realignment for her pain.           History of Present Illness:   Ashly Paulino is a 57 year old female who presents for an evaluation regarding recurrent left knee pain. I last evaluated the patient on 11/13/2017, at which time the patient reported a painful left TKA since 1/2017. See note for further details. We elected to proceed with a CT scan to assess alignment, aspiration to rule out infection, and bone scan to rule out occult loosening.     Today, the patient reports her pain has worsened since her last her last visit. Pain is localized in the lateral aspect of the knee. Her pain is the worst while sleeping and biking. She sleeps with a pillow between her leg to provide relief. She has gone to physical therapy for gluteal strengthening. Her knee warmth has improved but is still  present. She has full knee ROM. She has the sensation of instability in her knee with certain motions that are hard to characterize. She states she cannot feel her left quadriceps when she fires it. Of note, she is seeing a rheumatologist next month because the arthritis in her neck and foot are worsening. She voices no further concerns at this time.     Current symptoms:  Awakens from sleep due to sx's:  Yes  Precipitating Injury:  No    Other joints or sites painful:  Yes, as per above        Background history:  DX:  1. DJD knees     TREATMENTS:  1. 10/31/16, Left TKA, Dr. KETAN Tran,  [facility]  2. 11/12/2008, Right TKA, Dr. KETAN Tran, [facility]  3. 5/17/12, R rotator cuff repair ([facility]ma[facility] Hargrove)           Physical Exam:   EXAMINATION pertinent findings:   RESP: non labored breathing   ABD: benign   SKIN: grossly normal   LYMPHATIC: grossly normal   NEURO: grossly normal   VASCULAR: satisfactory perfusion of all extremities   MUSCULOSKELETAL:   Left knee: 0-120 degree ROM. No laxity of medial and collateral ligaments. No posterior or anterior laxity. No effusion. Very subtle warmth overlying lateral retinacular tissues. Minimal quadriceps atrophy.   Right knee:0-120 degree ROM. No laxity of medial and collateral ligaments. No posterior or anterior laxity. No effusion. Minimal quadriceps atrophy.   No pain with bilateral hip rotation             Data:   All laboratory data reviewed  All imaging studies reviewed by me    Radiograph of the left knee - 1/2 views (7/15/2019)  No radiographic change since her last x-ray on 11/13/2017    Iftikhar Wu MD  [facility] Professor  Oncology and Adult Reconstructive Surgery  Dept Orthopaedic Surgery, LTAC, located within [facility]  [phone] office, [phone] pager  [email or web address]    Total Time = 25 min, 50% of which was spent in counseling and coordination of care as documented above.    Scribe Disclosure:  I, Kojo Chun, am serving as a scribe to document services  personally performed by Iftikhar Wu MD at this visit, based upon the provider's statements to me. All documentation has been reviewed by the aforementioned provider prior to being entered into the official medical record.

## 2019-07-15 NOTE — PROGRESS NOTES
[facility]  Orthopaedic Surgery, Joint Replacement Consultation  by Iftikhar Wu M.D.    Ashly Paulino MRN# [id number]   Age: 58 year old YOB: 1960     Requesting physician: Referred Ashly Drake          Assessment and Plan:   Assessment:  Painful L TKA.  Unclear etiology.  Lateral side sx's suggest Patellofemoral origin.  Diff Dx:  PF imbalance, rotational malalignment femoral component, lateral retinacular tightness.       Plan:  1. Repeat CT scan of the left knee to evaluate for patellar component loosening and patellar cyst. If there is evidence of patellar loosening or cyst growth, then we will proceed with a revision. If there is no change on CT scan I will refer the patient to Dr. Dejesus for evaluation of patellar subluxation of subtle nature and consideration of patellar femoral realignment for her pain.           History of Present Illness:   Ashly Paulino is a 57 year old female who presents for an evaluation regarding recurrent left knee pain. I last evaluated the patient on 11/13/2017, at which time the patient reported a painful left TKA since 1/2017. See note for further details. We elected to proceed with a CT scan to assess alignment, aspiration to rule out infection, and bone scan to rule out occult loosening.     Today, the patient reports her pain has worsened since her last her last visit. Pain is localized in the lateral aspect of the knee. Her pain is the worst while sleeping and biking. She sleeps with a pillow between her leg to provide relief. She has gone to physical therapy for gluteal strengthening. Her knee warmth has improved but is still present. She has full knee ROM. She has the sensation of instability in her knee with certain motions that are hard to characterize. She states she cannot feel her left quadriceps when she fires it. Of note, she is seeing a rheumatologist next month because the arthritis in her neck and foot are  worsening. She voices no further concerns at this time.     Current symptoms:  Awakens from sleep due to sx's:  Yes  Precipitating Injury:  No    Other joints or sites painful:  Yes, as per above        Background history:  DX:  1. DJD knees     TREATMENTS:  1. 10/31/16, Left TKA, Dr. KETAN Tran, [facility]  2. 11/12/2008, Right TKA, Dr. KETAN Tran, [facility]  3. 5/17/12, R rotator cuff repair (Armaan Hargrove)           Physical Exam:   EXAMINATION pertinent findings:   RESP: non labored breathing   ABD: benign   SKIN: grossly normal   LYMPHATIC: grossly normal   NEURO: grossly normal   VASCULAR: satisfactory perfusion of all extremities   MUSCULOSKELETAL:   Left knee: 0-120 degree ROM. No laxity of medial and collateral ligaments. No posterior or anterior laxity. No effusion. Very subtle warmth overlying lateral retinacular tissues. Minimal quadriceps atrophy.   Right knee:0-120 degree ROM. No laxity of medial and collateral ligaments. No posterior or anterior laxity. No effusion. Minimal quadriceps atrophy.   No pain with bilateral hip rotation             Data:   All laboratory data reviewed  All imaging studies reviewed by me    Radiograph of the left knee - 1/2 views (7/15/2019)  No radiographic change since her last x-ray on 11/13/2017    Iftikhar Wu MD  [facility] Family Professor  Oncology and Adult Reconstructive Surgery  Dept Orthopaedic Surgery, [facility]  [phone] office, [phone] pager  [email or web address]    Total Time = 25 min, 50% of which was spent in counseling and coordination of care as documented above.    Scribe Disclosure:  I, Kojo Chun, am serving as a scribe to document services personally performed by Iftikhar Wu MD at this visit, based upon the provider's statements to me. All documentation has been reviewed by the aforementioned provider prior to being entered into the official medical record.   Answers for HPI/ROS submitted by the patient on 7/15/2019   General  Symptoms: No  Skin Symptoms: No  HENT Symptoms: Yes  EYE SYMPTOMS: Yes  HEART SYMPTOMS: No  LUNG SYMPTOMS: No  INTESTINAL SYMPTOMS: No  URINARY SYMPTOMS: No  GYNECOLOGIC SYMPTOMS: No  BREAST SYMPTOMS: No  SKELETAL SYMPTOMS: Yes  BLOOD SYMPTOMS: No  NERVOUS SYSTEM SYMPTOMS: No  MENTAL HEALTH SYMPTOMS: No  Ear pain: No  Ear discharge: No  Hearing loss: No  Tinnitus: Yes  Nosebleeds: No  Congestion: No  Sinus pain: No  Trouble swallowing: No   Voice hoarseness: No  Mouth sores: No  Sore throat: No  Tooth pain: No  Gum tenderness: No  Bleeding gums: No  Change in taste: No  Change in sense of smell: No  Dry mouth: No  Hearing aid used: No  Neck lump: No  Eye pain: No  Vision loss: No  Dry eyes: No  Watery eyes: No  Eye bulging: No  Double vision: No  Flashing of lights: No  Spots: No  Back pain: No  Muscle aches: No  Neck pain: No  Swollen joints: Yes  Joint pain: Yes  Bone pain: No  Muscle cramps: No  Muscle weakness: Yes  Joint stiffness: Yes  Bone fracture: No

## 2019-08-19 ENCOUNTER — OFFICE VISIT (OUTPATIENT)
Dept: ORTHOPEDICS | Facility: CLINIC | Age: 59
End: 2019-08-19
Payer: COMMERCIAL

## 2019-08-19 DIAGNOSIS — Z96.652 STATUS POST TOTAL LEFT KNEE REPLACEMENT: Primary | ICD-10-CM

## 2019-08-19 RX ORDER — DULOXETIN HYDROCHLORIDE 20 MG/1
20 CAPSULE, DELAYED RELEASE ORAL EVERY MORNING
COMMUNITY
Start: 2019-08-12

## 2019-08-19 ASSESSMENT — ENCOUNTER SYMPTOMS
DECREASED CONCENTRATION: 0
PANIC: 0
BACK PAIN: 0
EYE PAIN: 0
EYE REDNESS: 0
JOINT SWELLING: 1
MUSCLE WEAKNESS: 1
ARTHRALGIAS: 1
MYALGIAS: 1
MUSCLE CRAMPS: 0
EYE IRRITATION: 0
DOUBLE VISION: 0
NECK PAIN: 1
INSOMNIA: 1
DEPRESSION: 1
STIFFNESS: 1
EYE WATERING: 0
NERVOUS/ANXIOUS: 1

## 2019-08-19 ASSESSMENT — ACTIVITIES OF DAILY LIVING (ADL): FUNCTION,_DAILY_LIVING_SCORE: 73.52

## 2019-08-19 NOTE — PROGRESS NOTES
[facility]  Orthopaedic Surgery, Joint Replacement Consultation  by Iftikhar Wu M.D.    Ashly Paulino MRN# [id number]   Age: 58 year old YOB: 1960     Requesting physician: Ashly Browning     Background history:  DX:  1. DJD knees     TREATMENTS:  1. 10/31/16, Left TKA, Dr. KETAN Tran, [facility]  2. 11/12/2008, Right TKA, Dr. KETAN Tran, [facility]  3. 5/17/12, R rotator cuff repair (Armaan Hargrove)            History of Present Illness:   Ashly Paulino is a 57 year old female who presents for an evaluation regarding recurrent left knee pain. I last evaluated the patient on 7/15/19 at which time she reported worsening left knee pain following a left TKA in 2016. We elected to obtain a repeat CT scan of her left knee for further evaluation.  She obtained a CT scan and is here for discussion.    Today, the patient reports continued pain in both of her knees left much worse than right.  Symptoms have worsened since last time I saw her, currently significantly limited in her activities of daily living.  She is no longer involving any of her leisure activities such as playing tennis.  Can ride her bike with minor difficulties.  She reports that her symptoms are currently worse when she is laying in down, improved by placing a pillow under her knees and allowing some flexion.  She is also experiencing significant pain in her feet, currently being treated by another orthopedic surgeon.  Denies low back pain.  She denies any fevers, chills, swelling, or redness around her knees.            Physical Exam:   EXAMINATION pertinent findings:   RESP: non labored breathing   ABD: benign   SKIN: grossly normal   LYMPHATIC: grossly normal   NEURO: grossly normal   VASCULAR: satisfactory perfusion of all extremities   MUSCULOSKELETAL:   Left lower extremity: There is a midline incision well-healed without evidence of erythema fluctuance or drainage.  The skin is not  warmer to the touch when compared to the contralateral side.  Range of motion is from 0 to 125 degrees.  The knee is stable to varus and valgus stress at 0 degrees, she does have a +1 laxity with the knee bent at 30 degrees with varus and valgus stress.  The patella tracks smoothly.  She is not tender to palpation around the patella or around the medial or lateral joint line.  Negative straight leg raise.         Data:   All laboratory data reviewed  All imaging studies reviewed by me     CT of the left knee (7/15/2019)  CT scan shows increased in the size and number of cysts under the patellar implant, as well as under the tibial component medially and lateral.  No evidence of fractures or malalignment.         Assessment and Plan:   Assessment:  58-year-old female with a painful total knee arthroplasty performed 3 years ago at an outside facility.  We discussed with the patient that radiographically she does have evidence of cystic changes under the patellar and tibial tray implant.  We discussed potential etiologies for this, including infection.  We had previously aspirated her knee and there was no concern for infection, and our exam today the knee joint looks benign regarding infectious processes we discussed with the patient It is possible that she has an aseptic loosening processes that is causing the cystic changes under her patellar implant.    We discussed with the patient that our current recommendation would be revision of her patellar implant.  Intraoperatively if we were concerned about the stability of the femoral or tibial implant we would revise him at that time as well.    Prior to this appointment the patient had already scheduled a visit with another orthopedic surgeon for a third opinion.  She would like to go to this appointment before she makes a final decision regarding her treatment plans.  We answer her questions to her satisfaction.  If she does decide to have surgery she will call us and  we will schedule her accordingly.    Layo Olivarez MD  Orthopaedic Surgery, PGY-4        MD Jeana Reyes Family Professor  Oncology and Adult Reconstructive Surgery  Dept Orthopaedic Surgery, [facility]  [phone] office, [phone] pager  [email or web address]      Answers for HPI/ROS submitted by the patient on 8/19/2019   General Symptoms: No  Skin Symptoms: No  HENT Symptoms: No  EYE SYMPTOMS: Yes  HEART SYMPTOMS: No  LUNG SYMPTOMS: No  INTESTINAL SYMPTOMS: No  URINARY SYMPTOMS: No  GYNECOLOGIC SYMPTOMS: No  BREAST SYMPTOMS: No  SKELETAL SYMPTOMS: Yes  BLOOD SYMPTOMS: No  NERVOUS SYSTEM SYMPTOMS: No  MENTAL HEALTH SYMPTOMS: Yes  Eye pain: No  Vision loss: No  Dry eyes: Yes  Watery eyes: No  Eye bulging: No  Double vision: No  Flashing of lights: No  Spots: No  Floaters: Yes  Redness: No  Crossed eyes: No  Tunnel Vision: No  Yellowing of eyes: No  Eye irritation: No  Back pain: No  Muscle aches: Yes  Neck pain: Yes  Swollen joints: Yes  Joint pain: Yes  Bone pain: Yes  Muscle cramps: No  Muscle weakness: Yes  Joint stiffness: Yes  Bone fracture: No  Nervous or Anxious: Yes  Depression: Yes  Trouble sleeping: Yes  Trouble thinking or concentrating: No  Mood changes: No  Panic attacks: No      Attending MD (Dr. Iftikhar Wu) Attestation :  This patient was seen and evaluated by me including a history, exam, and interpretation of all imaging and/or lab data.  Either a training physician (resident/fellow), who also saw the patient, or scribe has documented the visit in the attached note.    MD Jeana Reyes  Oncology and Adult Reconstructive Surgery  Dept Orthopaedic Surgery, [facility]  [phone] office, [phone] pager  [email or web address]

## 2019-08-19 NOTE — LETTER
8/19/2019       RE: Ashly Paulino  [address]  [place] [address]     Dear Colleague,    Thank you for referring your patient, Ashly Paulino, to the [facility]. Please see a copy of my visit note below.        [facility]  Orthopaedic Surgery, Joint Replacement Consultation  by Iftikhar Wu M.D.    Ashly Paulino MRN# [id number]   Age: 58 year old YOB: 1960     Requesting physician: Ashly Browning     Background history:  DX:  1. DJD knees     TREATMENTS:  1. 10/31/16, Left [facility], [facility]  2. 11/12/2008, Right TKA, Dr. KETAN Tran, [facility]  3. 5/17/12, R rotator cuff repair (Armaan Hargrove)            History of Present Illness:   Ashly Paulino is a 57 year old female who presents for an evaluation regarding recurrent left knee pain. I last evaluated the patient on 7/15/19 at which time she reported worsening left knee pain following a left TKA in 2016. We elected to obtain a repeat CT scan of her left knee for further evaluation.  She obtained a CT scan and is here for discussion.    Today, the patient reports continued pain in both of her knees left much worse than right.  Symptoms have worsened since last time I saw her, currently significantly limited in her activities of daily living.  She is no longer involving any of her leisure activities such as playing tennis.  Can ride her bike with minor difficulties.  She reports that her symptoms are currently worse when she is laying in down, improved by placing a pillow under her knees and allowing some flexion.  She is also experiencing significant pain in her feet, currently being treated by another orthopedic surgeon.  Denies low back pain.  She denies any fevers, chills, swelling, or redness around her knees.            Physical Exam:   EXAMINATION pertinent findings:   RESP: non labored  breathing   ABD: benign   SKIN: grossly normal   LYMPHATIC: grossly normal   NEURO: grossly normal   VASCULAR: satisfactory perfusion of all extremities   MUSCULOSKELETAL:   Left lower extremity: There is a midline incision well-healed without evidence of erythema fluctuance or drainage.  The skin is not warmer to the touch when compared to the contralateral side.  Range of motion is from 0 to 125 degrees.  The knee is stable to varus and valgus stress at 0 degrees, she does have a +1 laxity with the knee bent at 30 degrees with varus and valgus stress.  The patella tracks smoothly.  She is not tender to palpation around the patella or around the medial or lateral joint line.  Negative straight leg raise.         Data:   All laboratory data reviewed  All imaging studies reviewed by me     CT of the left knee (7/15/2019)  CT scan shows increased in the size and number of cysts under the patellar implant, as well as under the tibial component medially and lateral.  No evidence of fractures or malalignment.         Assessment and Plan:   Assessment:  58-year-old female with a painful total knee arthroplasty performed 3 years ago at an outside facility.  We discussed with the patient that radiographically she does have evidence of cystic changes under the patellar and tibial tray implant.  We discussed potential etiologies for this, including infection.  We had previously aspirated her knee and there was no concern for infection, and our exam today the knee joint looks benign regarding infectious processes we discussed with the patient It is possible that she has an aseptic loosening processes that is causing the cystic changes under her patellar implant.    We discussed with the patient that our current recommendation would be revision of her patellar implant.  Intraoperatively if we were concerned about the stability of the femoral or tibial implant we would revise him at that time as well.    Prior to this  appointment the patient had already scheduled a visit with another orthopedic surgeon for a third opinion.  She would like to go to this appointment before she makes a final decision regarding her treatment plans.  We answer her questions to her satisfaction.  If she does decide to have surgery she will call us and we will schedule her accordingly.    Layo Olivarez MD  Orthopaedic Surgery, PGY-4    Attending MD (Dr. Iftikhar Wu) Attestation :  This patient was seen and evaluated by me including a history, exam, and interpretation of all imaging and/or lab data.  Either a training physician (resident/fellow), who also saw the patient, or scribe has documented the visit in the attached note.    MD Jeana Reyes Family Professor  Oncology and Adult Reconstructive Surgery  Dept Orthopaedic Surgery, [facility]  [phone] office, [phone] pager  [email or web address]

## 2019-08-26 PROBLEM — Z96.652 STATUS POST TOTAL LEFT KNEE REPLACEMENT: Status: RESOLVED | Noted: 2018-09-18 | Resolved: 2019-08-26

## 2019-08-26 PROBLEM — M25.562 ACUTE PAIN OF LEFT KNEE: Status: RESOLVED | Noted: 2018-09-18 | Resolved: 2019-08-26

## 2019-08-26 NOTE — PROGRESS NOTES
Discharge Note    Progress reporting period is from initial evaluation date (please see noted date below) to Oct 30, 2018.  Linked Episodes   Type: Episode: Status: Noted: Resolved: Last update: Updated by:   PHYSICAL THERAPY L knee pain Active 9/18/2018  10/30/2018  3:01 PM Juan Chaidez, PT      Comments:       Ashly failed to follow up and current status is unknown.  Please see information below for last relevant information on current status.  Patient seen for 2 visits.    SUBJECTIVE  Subjective changes noted by patient:  feeling  little stronger; had downhill epsiode and struggled   .  Current pain level is  .     Previous pain level was   .   Changes in function:  Yes (See Goal flowsheet attached for changes in current functional level)  Adverse reaction to treatment or activity: None    OBJECTIVE  Changes noted in objective findings:       ASSESSMENT/PLAN  Diagnosis: L knee   Updated problem list and treatment plan:   Pain - HEP  STG/LTGs have been met or progress has been made towards goals:  Yes, please see goal flowsheet for most current information  Assessment of Progress: current status is unknown.    Last current status: Pt is progressing as expected   Self Management Plans:  HEP  I have re-evaluated this patient and find that the nature, scope, duration and intensity of the therapy is appropriate for the medical condition of the patient.  Ashly continues to require the following intervention to meet STG and LTG's:  HEP.    Recommendations:  Discharge with current home program.  Patient to follow up with MD as needed.    Please refer to the daily flowsheet for treatment today, total treatment time and time spent performing 1:1 timed codes.

## 2019-08-27 ENCOUNTER — TELEPHONE (OUTPATIENT)
Dept: ORTHOPEDICS | Facility: CLINIC | Age: 59
End: 2019-08-27

## 2019-08-27 DIAGNOSIS — Z96.659: Primary | ICD-10-CM

## 2019-08-27 DIAGNOSIS — T84.098A: Primary | ICD-10-CM

## 2019-08-27 NOTE — TELEPHONE ENCOUNTER
Spoke with Ashly mcdonald: scheduling surgery to revise her left knee.  She said that [facility] had nothing different to add to a treatment plan, so she would like to proceed with having Dr. Wu do her surgery.  She chose 9/10/19.   She will get her H&P with her PCP and come to the PAC for an anesthesia consult.  Surgery packet was mailed to her home along with a Joint Replacement booklet to review.   She is travelling and has been experiencing pain in the opposite knee. She plans to have xrays and have that knee evaluated.  She will let us know if that evaluation changes her plans

## 2019-09-03 ENCOUNTER — TEAM CONFERENCE (OUTPATIENT)
Dept: ORTHOPEDICS | Facility: CLINIC | Age: 59
End: 2019-09-03

## 2019-09-03 NOTE — TELEPHONE ENCOUNTER
"SURGERY PLAN (PRE-OP PLAN)    Patient Position (indicated by x):    Supine   X  Supine with torso rolled up on a bump       X  Coban, Ioban, Stockinette       X  Extremity drape     Shoulder pack drape     Laparotomy drape   X  Dasilva catheter          General Equipment Requests (indicated by x):      C-Arm with C-Armor drape     C-Arm (video capable, GE 9900 model)     O-Arm with Stealth imaging     Fracture Table     Wes XR Table     SurgiGraphic 6000 (diving board) fluoro table     Cell saver     Wu Biopsy trephine set w/ K-wire & pituitary rongeurs   X  Small pituitary rongeur   X  Wu's angled curettes, narrow shaft     Bone graft, kapner gouges     Midas Adarsh Medtronic jaime, electric motor     Phenol 5%     Aurora BMAC stem cell     Vancomycin 1 gram powder     Zometa 4 mg vials     Depo Medrol steroid     Blunt Pelvic Retractor (.55, Blunt Hohmann with  slight bend)     Reciprocating saw single and double edge blades.   X  [place] Osteotomes, Flexible osteotomes     Implant Extraction/Cement spacer tools (indicated by x):     Biomet Ultrasound cement removal   X  Midas Adarsh, AC-1 (1mm tiny dissecting tip with wire guide gold handpiece)     Flexible osteotomes (both black and wood handled with new replacement blades)     Universal stem extractor     \"L\" hook & hoop style stem extractor stem extractor (DePuy AML stem extractor)     Ayaka Explant cup removal osteotomes     Suction tip with debris trap (clear plastic disposable)     Biomet offset implant impactor (white handle in Wu's cart)     [place] IM canal long shaft cement removal gouges, pituitary ronguers)     Biomet Spacer One hip stem spacer molds with trials     Biomet Articulating knee spacer molds with trials     Biomet [place] Blue gentamycin PMMA and Vanco 1 g vial/package PMMA     Fair Rods, large + gabe cutter     TKA Requests (indicated by x):     Biomet Vanguard TKA (+/- PCL retention)     Biomet SSK revision TKA + stems     " Fredi Trathlon revision knee + stems   X  Reciprocating saw     Universal tibial baseplate extractor     Biomet OSS rotating hinge knee     Biomet OSS distal femur replacement   X  S&N Legion TKA revision set   X  S&N Jasmin II set TKA     Specimens and cultures (indicated by x):   X  Tissue cultures, aerobic and anaerobic without gram stain     Frozen section     pathology specimens - fresh     pathology specimens - formalin     TREATMENTS:  1. 10/31/16, Left TKA, [facility] (Jasmin II tibia 3, patella 35, femur  2. 11/12/2008, Right TKA, [facility]  Summary:  58-year-old female with a painful total knee arthroplasty performed 3 years ago at an outside facility.  Currently no concern for infection. Possible an aseptic loosening of the patellar implant. Bone scan 2017 : Findings are suggestive of arthroplasty loosening.    Plan:  Revision patellar implant TKA left with possibly revision of all components in case loose.    Cecilio Fields MD  Orthopaedic Surgery, Adult Reconstruction Fellow  Pager [phone]

## 2019-09-05 NOTE — TELEPHONE ENCOUNTER
FUTURE VISIT INFORMATION      SURGERY INFORMATION:    Date: 9/10/19    Location: [facility] OR    Surgeon: DR BELLO    Anesthesia Type: GENERAL         RECORDS REQUESTED FROM:       Primary Care Provider:    Pertinent Medical History:    Most recent EKG with tracings/strips: 10/25/16    Most recent ECHO:     Most recent Cardiac Stress Test:    Most recent Coronary Angiogram:

## 2019-09-06 ENCOUNTER — ANESTHESIA EVENT (OUTPATIENT)
Dept: SURGERY | Facility: CLINIC | Age: 59
End: 2019-09-06
Payer: COMMERCIAL

## 2019-09-06 ENCOUNTER — PRE VISIT (OUTPATIENT)
Dept: SURGERY | Facility: CLINIC | Age: 59
End: 2019-09-06

## 2019-09-06 ENCOUNTER — OFFICE VISIT (OUTPATIENT)
Dept: SURGERY | Facility: CLINIC | Age: 59
End: 2019-09-06
Payer: COMMERCIAL

## 2019-09-06 VITALS
SYSTOLIC BLOOD PRESSURE: 131 MMHG | HEART RATE: 77 BPM | DIASTOLIC BLOOD PRESSURE: 82 MMHG | HEIGHT: 69 IN | RESPIRATION RATE: 16 BRPM | BODY MASS INDEX: 31.29 KG/M2 | TEMPERATURE: 98.5 F | WEIGHT: 211.3 LBS | OXYGEN SATURATION: 96 %

## 2019-09-06 DIAGNOSIS — Z01.818 PREOP EXAMINATION: ICD-10-CM

## 2019-09-06 DIAGNOSIS — T84.018S FAILURE OF TOTAL KNEE REPLACEMENT, SEQUELA: ICD-10-CM

## 2019-09-06 DIAGNOSIS — Z01.818 PREOP EXAMINATION: Primary | ICD-10-CM

## 2019-09-06 DIAGNOSIS — Z96.659 FAILURE OF TOTAL KNEE REPLACEMENT, SEQUELA: ICD-10-CM

## 2019-09-06 LAB
ALBUMIN UR-MCNC: NEGATIVE MG/DL
ANION GAP SERPL CALCULATED.3IONS-SCNC: 1 MMOL/L (ref 3–14)
APPEARANCE UR: CLEAR
BILIRUB UR QL STRIP: NEGATIVE
BUN SERPL-MCNC: 26 MG/DL (ref 7–30)
CALCIUM SERPL-MCNC: 9.5 MG/DL (ref 8.5–10.1)
CHLORIDE SERPL-SCNC: 106 MMOL/L (ref 94–109)
CO2 SERPL-SCNC: 31 MMOL/L (ref 20–32)
COLOR UR AUTO: YELLOW
CREAT SERPL-MCNC: 0.84 MG/DL (ref 0.52–1.04)
GFR SERPL CREATININE-BSD FRML MDRD: 75 ML/MIN/{1.73_M2}
GLUCOSE SERPL-MCNC: 96 MG/DL (ref 70–99)
GLUCOSE UR STRIP-MCNC: NEGATIVE MG/DL
HGB UR QL STRIP: NEGATIVE
KETONES UR STRIP-MCNC: NEGATIVE MG/DL
LEUKOCYTE ESTERASE UR QL STRIP: NEGATIVE
MRSA DNA SPEC QL NAA+PROBE: NEGATIVE
NITRATE UR QL: NEGATIVE
PH UR STRIP: 5 PH (ref 5–7)
POTASSIUM SERPL-SCNC: 4.6 MMOL/L (ref 3.4–5.3)
SODIUM SERPL-SCNC: 138 MMOL/L (ref 133–144)
SOURCE: NORMAL
SP GR UR STRIP: 1.02 (ref 1–1.03)
SPECIMEN SOURCE: NORMAL
UROBILINOGEN UR STRIP-MCNC: 0 MG/DL (ref 0–2)

## 2019-09-06 ASSESSMENT — MIFFLIN-ST. JEOR: SCORE: 1592.83

## 2019-09-06 ASSESSMENT — LIFESTYLE VARIABLES: TOBACCO_USE: 0

## 2019-09-06 NOTE — ADDENDUM NOTE
Addendum  created 09/06/19 1824 by Kita Griffin APRN CNP    Diagnosis association updated, Order list changed

## 2019-09-06 NOTE — PATIENT INSTRUCTIONS
Preparing for Your Surgery      Name:  Ashly Paulino   MRN:  [id number]   :  1960   Today's Date:  2019     Arriving for surgery:  Surgery date:  9/10/19  Arrival time:  11:00 am  Please come to:     [facility] [address]  [address] [place]  [address]    - parking is available in front of [facility] from 5:15AM to 8:00PM. If you prefer, park your car in the Green Lot.    -Proceed to the 3rd floor, check in at the Adult Surgery Waiting Lounge. [phone]    If an escort is needed stop at the Information Desk in the lobby. Inform the information person that you are here for surgery. An escort to the Adult Surgery Waiting Lounge will be provided.        What can I eat or drink?  -  You may have solid food or milk products until 8 hours prior to your surgery (5:30 am).  -  You may have water, apple juice or 7up/Sprite until 2 hours prior to your surgery (11:30 am).    Which medicines can I take?  Stop Aspirin, vitamins and supplements one week prior to surgery.  Hold Ibuprofen for 24 hours and/or Naproxen for 48 hours prior to surgery.   Hold Celecoxib (Celebrex) 2 days prior to surgery.  -  Do NOT take these medications in the morning, the day of surgery:  Losartan (Cozaar)  Duloxetine (Cymbalta)    -  Please take these medications the day of surgery:  Acetaminophen (Tylenol)  Albuterol (Proventil)  Fexofenadine (Allegra)    How do I prepare myself?  -  Take two showers: one the night before surgery; and one the morning of surgery.         Use Scrubcare or Hibiclens to wash from neck down, leave soap on your skin for up to one minute.  Do not get soap in your eyes or ears.  You may use your own shampoo and conditioner; no other hair products.   -  Do NOT use lotion, powder, deodorant, or antiperspirant the day of your surgery.  -  Do NOT wear any makeup, fingernail polish or jewelry.  -  Do not bring your own medications to the  hospital.  -  Bring your ID and insurance card.    Questions or Concerns:  -If you are scheduled on the [facility] or [facility] campus and have questions or concerns regarding the day of surgery, please call Preadmission Nursing at [phone].     -For questions after surgery please call your surgeons office.   Enhanced Recovery After Surgery     This is a team effort, including you, to get you back on your feet, eating and drinking normally and out of the hospital as quickly as possible.  The goals are:          1) NO INFECTIONS and   2) RETURN TO NORMAL DIET    How can we achieve these goals?  1) STAY ACTIVE: Walk every day before your surgery; try to increase the amount every day.  Walk after surgery as much as you can-the nurses will help you.  Walking speeds healing and gets you home quicker, you heal better at home and have less risk of infection.     2) INCENTIVE SPIROMETER: Practice your incentive spirometer 4 times per day with 5 repetitions each time.  Using the incentive spirometer can strengthen your muscles between your ribs and help you have a strong cough after surgery.  A more effective cough can help prevent problems with your lungs.    3) STAY HYDRATED: Drink clear liquids up until 2 hours before your surgery. We would like you to purchase a drink such as Gatorade or Ensure Clear (not the milkshake type).  Drink this before bedtime and on the way into the hospital, drink between 8-10 ounces or until you feel hydrated.  Keeping well hydrated leads to your veins being plump, you wake up faster, and you are less likely to be nauseated. Start drinking water as soon as you can after surgery and advance to clear liquids and food as tolerated.  IV fluids contain salt, drinking fluids will minimize the amount of IV fluids you need and decrease the amount of salt you get.    The most common reason for the patient to be readmitted is dehydration. Staying hydrated after you go home from the hospital is very important.   Ensure or Ensure Clear are good options to keep you hydrated.     4) PAIN MANAGEMENT: If we minimize the amount of opioids and narcotics, and use regional blocks (which numb the area where your surgery is) along with oral pain medications; you will have less side effects of nausea and constipation. Narcotics can slow down your bowels and cause you to stay in the hospital longer.     Our goal is to keep you comfortable; eating and drinking normally and back home safely.   Using an Incentive Spirometer    An incentive spirometer is a device that helps you do deep breathing exercises. These exercises expand your lungs, aid in circulation, and help prevent pneumonia. Deep breathing exercises also help you breathe better and improve the function of your lungs by:    Keeping your lungs clear    Strengthening your breathing muscles    Helping prevent respiratory complications or problems  The incentive spirometer gives you a way to take an active part in your care. A nurse or therapist will teach you breathing exercises. To do these exercises, you will breathe in through your mouth and not your nose. The incentive spirometer only works correctly if you breathe in through your mouth.    Steps to clear lungs  Step 1. Exhale normally. Then, inhale normally.    Relax and breathe out.  Step 2. Place your lips tightly around the mouthpiece.    Make sure the device is upright and not tilted.  Step 3. Inhale as much air as you can through the mouthpiece (don't breath through your nose).    Inhale slowly and deeply.    Hold your breath long enough to keep the balls or disk raised for at least 3 to 5 seconds, or as instructed by your healthcare provider.  Step 4. Repeat the exercise regularly.  Begin using the Incentive Spirometer one week prior to your surgery, 4 times per day-5 times each.    AFTER YOUR SURGERY  Breathing exercises   Breathing exercises help you recover faster. Take deep breaths and let the air out slowly. This  will:     Help you wake up after surgery.    Help prevent complications like pneumonia.  Preventing complications will help you go home sooner.   We may give you a breathing device (incentive spirometer) to encourage you to breathe deeply.   Nausea and vomiting   You may feel sick to your stomach after surgery; if so, let your nurse know.    Pain control:  After surgery, you may have pain. Our goal is to help you manage your pain. Pain medicine will help you feel comfortable enough to do activities that will help you heal.  These activities may include breathing exercises, walking and physical therapy.   To help your health care team treat your pain we will ask: 1) If you have pain  2) where it is located 3) describe your pain in your words  Methods of pain control include medications given by mouth, vein or by nerve block for some surgeries.  Sequential Compression Device (SCD) or Pneumo Boots:  You may need to wear SCD S on your legs or feet. These are wraps connected to a machine that pumps in air and releases it. The repeated pumping helps prevent blood clots from forming.

## 2019-09-06 NOTE — ANESTHESIA PREPROCEDURE EVALUATION
Anesthesia Pre-Procedure Evaluation    Patient: Ashly Paulino   MRN:     [id number] Gender:   female   Age:    58 year old :      1960        Preoperative Diagnosis: Failure Left Knee Replacement   Procedure(s):  Revision Left Total Knee Replacement     Past Medical History:   Diagnosis Date     Adjustment disorder      Allergic rhinitis      Backache      Degenerative joint disease 2014    injections begin for left knee arthritis, TKA 10/31/16     Depression      GERD (gastroesophageal reflux disease)      Insomnia      Neck injuries     Arthritic changes in bilteral feet & cervical      Tinnitus      Tinnitus, unspecified laterality      Uncomplicated asthma       Past Surgical History:   Procedure Laterality Date     ARTHROPLASTY KNEE Left 10/31/2016    Procedure: ARTHROPLASTY KNEE;  Surgeon: Gregory Tran MD;  Location: [facility] OR     C SHOULDER SURG PROC UNLISTED  16    Right Rotator Cuff repair     C STOMACH SURGERY PROCEDURE UNLISTED        SECTION        SECTION, IMMEDIATE HYSTERECTOMY, COMBINED      triplets     COLONOSCOPY       ENT SURGERY      septoplasty     esophogastroduodenoscopy       KNEE SURGERY  10/31/16    lateral knee discomfort begins      MYOMECTOMY UTERUS       OOPHORECTOMY Right      ORTHOPEDIC SURGERY      lt rotator cuff repair     partial lateral meniscus repair      rt     pr lateral retinacular release open       rt lateral meniscectomy and cyst removal            Anesthesia Evaluation     . Pt has had prior anesthetic. Type: General and Regional    History of anesthetic complications   - PONV  slow to wake      ROS/MED HX    ENT/Pulmonary:     (+)sleep apnea (borderline sleep apnea.  uses mouthgaurd), allergic rhinitis, Intermittent asthma Treatment: Inhaled steroids and Inhaler prn,  doesn't use CPAP , . .   (-) tobacco use   Neurologic:  - neg neurologic ROS     Cardiovascular:     (+) hypertension----. : . . . :. .       METS/Exercise  Tolerance:  >4 METS   Hematologic:     (+) History of Transfusion previous transfusion reaction - itching, -      Musculoskeletal:   (+) arthritis,  other musculoskeletal- bilateral feet      GI/Hepatic:     (+) GERD       Renal/Genitourinary:  - ROS Renal section negative       Endo:     (+) Obesity, Other Endocrine Disorder prediabetes.      Psychiatric:     (+) psychiatric history depression      Infectious Disease:  - neg infectious disease ROS       Malignancy:      - no malignancy   Other:    (+) No chance of pregnancy H/O Chronic Pain,                       PHYSICAL EXAM:   Mental Status/Neuro: A/A/O   Airway: Facies: Feasible  Mallampati: I  Mouth/Opening: Full  TM distance: > 6 cm  Neck ROM: Full   Respiratory: Auscultation: CTAB     Resp. Rate: Normal     Resp. Effort: Normal      CV: Rhythm: Regular  Rate: Age appropriate  Heart: Normal Sounds  Edema: None   Comments:      Dental: Normal Dentition                LABS:  CBC:   Lab Results   Component Value Date    HGB 12.0 11/02/2016    HGB 12.7 11/01/2016     BMP:   Lab Results   Component Value Date     11/13/2008     (H) 11/12/2008    POTASSIUM 4.5 11/13/2008    POTASSIUM 4.5 11/12/2008    CHLORIDE 104 11/13/2008    CHLORIDE 105 11/12/2008    CO2 29 11/13/2008    CO2 29 11/12/2008    BUN 25 (H) 11/12/2008    CR 0.80 10/31/2016    CR 0.83 11/12/2008     (H) 11/02/2016     (H) 11/12/2008     COAGS:   Lab Results   Component Value Date    PTT 29 11/12/2008    INR 1.50 (H) 11/14/2008     POC:   Lab Results   Component Value Date     (H) 11/01/2016     OTHER:   Lab Results   Component Value Date    GENEVIEVE 9.0 11/12/2008    CRP <2.9 11/13/2017    SED 6 11/13/2017        Preop Vitals    BP Readings from Last 3 Encounters:   09/06/19 131/82   11/02/16 117/76   10/25/16 (!) 160/102    Pulse Readings from Last 3 Encounters:   09/06/19 77   10/31/16 71      Resp Readings from Last 3 Encounters:   09/06/19 16   11/02/16 16   10/25/16  "16    SpO2 Readings from Last 3 Encounters:   09/06/19 96%   11/02/16 97%   10/25/16 97%      Temp Readings from Last 1 Encounters:   09/06/19 98.5  F (36.9  C) (Oral)    Ht Readings from Last 1 Encounters:   09/06/19 1.753 m (5' 9\")      Wt Readings from Last 1 Encounters:   09/06/19 95.8 kg (211 lb 4.8 oz)    Estimated body mass index is 31.2 kg/m  as calculated from the following:    Height as of this encounter: 1.753 m (5' 9\").    Weight as of this encounter: 95.8 kg (211 lb 4.8 oz).     LDA:        Assessment:   ASA SCORE: 3    H&P: History and physical reviewed and following examination; no interval change.   Smoking Status:  Non-Smoker/Unknown        Plan:   Anes. Type:  General   Pre-Medication: Acetaminophen; Gabapentin   Induction:  IV (Standard)   Airway: ETT; Oral   Access/Monitoring: PIV   Maintenance: Balanced     Postop Plan:   Postop Pain: Opioids  Postop Sedation/Airway: Not planned     PONV Management:   Adult Risk Factors: Female, Non-Smoker, Postop Opioids   Prevention: Ondansetron, Dexamethasone, Scopolamine     CONSENT: Direct conversation   Plan and risks discussed with: Patient   Blood Products: Consented (ALL Blood Products)                PAC Discussion and Assessment    ASA Classification: 3  Case is suitable for: [facility]  Anesthetic techniques and relevant risks discussed: GA  Invasive monitoring and risk discussed:   Types:   Possibility and Risk of blood transfusion discussed:   NPO instructions given:   Additional anesthetic preparation and risks discussed:   Needs early admission to pre-op area:   Other:     PAC Resident/NP Anesthesia Assessment:  Ashly Paulino is a 58 year old female scheduled for a Revision Left Total Knee Replacement (Left Knee) on 9/10/2019 by Dr. Wu in treatment of a failed left knee replacement.  PAC referral for risk assessment and optimization for anesthesia with comorbid conditions of: hypertension, allergic rhinitis, asthma, borderline sleep apnea, " "obesity, OA, bilateral foot pain and prediabetes.  Pre-operative considerations:  1.  Cardiac:  Functional status- METS >4.  Due to her knee and bilateral foot OA, she is only able to walk for exercise, not do anything more strenuous.  She notes that in the last few days she has walked 5-7 miles total.  She had a negative stress test in 2013.   Hypertension is managed with losartan; hold DOS.  Intermediate risk surgery with 0.4% risk of major adverse cardiac event.   2.  Pulm:  Airway feasible.  She notes that she had \"borderline\" sleep apnea and uses a mouthguard.  Asthma is well controlled.  She reports that she only uses her Advair when needed in conjunction with a URI.  Denies any recent asthma exacerbations.  3.  GI:  Risk of PONV score = 3.  If > 2, anti-emetic intervention recommended.  4. Endo:  She is obese with a BMI >30.     VTE risk: 0.5%    Patient is optimized and is acceptable candidate for the proposed procedure.  No further diagnostic evaluation is needed.     ERAS pathway initiated.     **For further details of assessment, testing, and physical exam please see H and P completed on 9/4/2019 by Dr. Toussaint at [facility] (see note in Care Everywhere).          Kita Griffin DNP, RN, APRN      Reviewed and Signed by PAC Mid-Level Provider/Resident  Mid-Level Provider/Resident: Kita Griffin DNP, RN, APRN  Date: 9/6/2019  Time: 1505    Attending Anesthesiologist Anesthesia Assessment:        Anesthesiologist:   Date:   Time:   Pass/Fail:   Disposition:     PAC Pharmacist Assessment:        Pharmacist:   Date:   Time:    JAZZY Higgins CNP  "

## 2019-09-06 NOTE — RESULT ENCOUNTER NOTE
Tian Limon,    Your test results are attached. Your nasal swab is negative for any infection.       Kita Griffin DNP, RN, ANP-C

## 2019-09-06 NOTE — RESULT ENCOUNTER NOTE
Tian Limon,    Your test results are attached.  Your labs are good for surgery.  I just tried to call, but there was no answer so I hope you have received the message I left on your mobile phone.      Your blood typing shows that you have a blood antibody which is likely related to your previous blood transfusions.  When there is an antibody, the blood type specimen expires in 72 hours for safety reasons.  If you can please return here to the lab on Monday, 9/9 to have your blood type redrawn at the lab, that would be great.  I have placed an order.  You do not need to make an appointment.    Thank you!        Kita Griffin DNP, RN, ANP-C

## 2019-09-09 ENCOUNTER — TELEPHONE (OUTPATIENT)
Dept: URGENT CARE | Facility: URGENT CARE | Age: 59
End: 2019-09-09

## 2019-09-09 DIAGNOSIS — Z01.818 PREOP EXAMINATION: ICD-10-CM

## 2019-09-09 LAB
ABO + RH BLD: ABNORMAL
BLD GP AB INVEST PLASRBC-IMP: ABNORMAL
BLD GP AB SCN SERPL QL: ABNORMAL
BLD GP AB SCN SERPL QL: ABNORMAL
BLD PROD TYP BPU: ABNORMAL
BLOOD BANK CMNT PATIENT-IMP: ABNORMAL
NUM BPU REQUESTED: 2
SPECIMEN EXP DATE BLD: ABNORMAL
SPECIMEN EXP DATE BLD: ABNORMAL

## 2019-09-09 NOTE — TELEPHONE ENCOUNTER
09/09/2019    Blood Bank called due to presents of antibodies in blood work.  Pt. Had come in today due to a positive draw on Friday and again jose positive again today.  Information passed on to Aspen Kirk for approval.

## 2019-09-10 ENCOUNTER — ANESTHESIA (OUTPATIENT)
Dept: SURGERY | Facility: CLINIC | Age: 59
End: 2019-09-10
Payer: COMMERCIAL

## 2019-09-10 ENCOUNTER — HOSPITAL ENCOUNTER (INPATIENT)
Facility: CLINIC | Age: 59
LOS: 1 days | Discharge: HOME | End: 2019-09-11
Attending: ORTHOPAEDIC SURGERY | Admitting: ORTHOPAEDIC SURGERY
Payer: COMMERCIAL

## 2019-09-10 ENCOUNTER — APPOINTMENT (OUTPATIENT)
Dept: GENERAL RADIOLOGY | Facility: CLINIC | Age: 59
End: 2019-09-10
Attending: ORTHOPAEDIC SURGERY
Payer: COMMERCIAL

## 2019-09-10 DIAGNOSIS — Z98.890 STATUS POST KNEE SURGERY: Primary | ICD-10-CM

## 2019-09-10 LAB
ANION GAP SERPL CALCULATED.3IONS-SCNC: 6 MMOL/L (ref 3–14)
BUN SERPL-MCNC: 21 MG/DL (ref 7–30)
CALCIUM SERPL-MCNC: 8.1 MG/DL (ref 8.5–10.1)
CHLORIDE SERPL-SCNC: 107 MMOL/L (ref 94–109)
CO2 SERPL-SCNC: 28 MMOL/L (ref 20–32)
CREAT SERPL-MCNC: 0.7 MG/DL (ref 0.52–1.04)
ERYTHROCYTE [DISTWIDTH] IN BLOOD BY AUTOMATED COUNT: 12.5 % (ref 10–15)
GFR SERPL CREATININE-BSD FRML MDRD: >90 ML/MIN/{1.73_M2}
GLUCOSE BLDC GLUCOMTR-MCNC: 108 MG/DL (ref 70–99)
GLUCOSE SERPL-MCNC: 128 MG/DL (ref 70–99)
HCT VFR BLD AUTO: 40.3 % (ref 35–47)
HGB BLD-MCNC: 13.2 G/DL (ref 11.7–15.7)
MCH RBC QN AUTO: 31.4 PG (ref 26.5–33)
MCHC RBC AUTO-ENTMCNC: 32.8 G/DL (ref 31.5–36.5)
MCV RBC AUTO: 96 FL (ref 78–100)
PLATELET # BLD AUTO: 188 10E9/L (ref 150–450)
POTASSIUM SERPL-SCNC: 4.4 MMOL/L (ref 3.4–5.3)
RBC # BLD AUTO: 4.2 10E12/L (ref 3.8–5.2)
SODIUM SERPL-SCNC: 141 MMOL/L (ref 133–144)
WBC # BLD AUTO: 5.9 10E9/L (ref 4–11)

## 2019-09-10 PROCEDURE — 36000068 ZZH SURGERY LEVEL 5 1ST 30 MIN - UMMC: Performed by: ORTHOPAEDIC SURGERY

## 2019-09-10 PROCEDURE — 37000009 ZZH ANESTHESIA TECHNICAL FEE, EACH ADDTL 15 MIN: Performed by: ORTHOPAEDIC SURGERY

## 2019-09-10 PROCEDURE — 87075 CULTR BACTERIA EXCEPT BLOOD: CPT | Performed by: ORTHOPAEDIC SURGERY

## 2019-09-10 PROCEDURE — 37000008 ZZH ANESTHESIA TECHNICAL FEE, 1ST 30 MIN: Performed by: ORTHOPAEDIC SURGERY

## 2019-09-10 PROCEDURE — 25000128 H RX IP 250 OP 636: Performed by: PHYSICIAN ASSISTANT

## 2019-09-10 PROCEDURE — 40000171 ZZH STATISTIC PRE-PROCEDURE ASSESSMENT III: Performed by: ORTHOPAEDIC SURGERY

## 2019-09-10 PROCEDURE — 25800030 ZZH RX IP 258 OP 636: Performed by: PHYSICIAN ASSISTANT

## 2019-09-10 PROCEDURE — 25000128 H RX IP 250 OP 636: Performed by: STUDENT IN AN ORGANIZED HEALTH CARE EDUCATION/TRAINING PROGRAM

## 2019-09-10 PROCEDURE — 0SPD0NZ REMOVAL OF PATELLOFEMORAL SYNTHETIC SUBSTITUTE FROM LEFT KNEE JOINT, OPEN APPROACH: ICD-10-PCS | Performed by: ORTHOPAEDIC SURGERY

## 2019-09-10 PROCEDURE — 99232 SBSQ HOSP IP/OBS MODERATE 35: CPT | Performed by: INTERNAL MEDICINE

## 2019-09-10 PROCEDURE — 25000125 ZZHC RX 250: Performed by: ANESTHESIOLOGY

## 2019-09-10 PROCEDURE — 0SRD0N9 REPLACEMENT OF LEFT KNEE JOINT WITH PATELLOFEMORAL SYNTHETIC SUBSTITUTE, CEMENTED, OPEN APPROACH: ICD-10-PCS | Performed by: ORTHOPAEDIC SURGERY

## 2019-09-10 PROCEDURE — 88300 SURGICAL PATH GROSS: CPT | Performed by: ORTHOPAEDIC SURGERY

## 2019-09-10 PROCEDURE — 25000125 ZZHC RX 250: Performed by: PHYSICIAN ASSISTANT

## 2019-09-10 PROCEDURE — 88300 SURGICAL PATH GROSS: CPT | Mod: 26 | Performed by: ORTHOPAEDIC SURGERY

## 2019-09-10 PROCEDURE — 25000132 ZZH RX MED GY IP 250 OP 250 PS 637: Performed by: STUDENT IN AN ORGANIZED HEALTH CARE EDUCATION/TRAINING PROGRAM

## 2019-09-10 PROCEDURE — 25800030 ZZH RX IP 258 OP 636: Performed by: ANESTHESIOLOGY

## 2019-09-10 PROCEDURE — 25000125 ZZHC RX 250: Performed by: STUDENT IN AN ORGANIZED HEALTH CARE EDUCATION/TRAINING PROGRAM

## 2019-09-10 PROCEDURE — 25000128 H RX IP 250 OP 636: Performed by: ANESTHESIOLOGY

## 2019-09-10 PROCEDURE — 27210794 ZZH OR GENERAL SUPPLY STERILE: Performed by: ORTHOPAEDIC SURGERY

## 2019-09-10 PROCEDURE — 36000070 ZZH SURGERY LEVEL 5 EA 15 ADDTL MIN - UMMC: Performed by: ORTHOPAEDIC SURGERY

## 2019-09-10 PROCEDURE — C1776 JOINT DEVICE (IMPLANTABLE): HCPCS | Performed by: ORTHOPAEDIC SURGERY

## 2019-09-10 PROCEDURE — 00000146 ZZHCL STATISTIC GLUCOSE BY METER IP

## 2019-09-10 PROCEDURE — 80048 BASIC METABOLIC PNL TOTAL CA: CPT | Performed by: ANESTHESIOLOGY

## 2019-09-10 PROCEDURE — 71000014 ZZH RECOVERY PHASE 1 LEVEL 2 FIRST HR: Performed by: ORTHOPAEDIC SURGERY

## 2019-09-10 PROCEDURE — 85027 COMPLETE CBC AUTOMATED: CPT | Performed by: ANESTHESIOLOGY

## 2019-09-10 PROCEDURE — 25800025 ZZH RX 258: Performed by: ORTHOPAEDIC SURGERY

## 2019-09-10 PROCEDURE — 93005 ELECTROCARDIOGRAM TRACING: CPT

## 2019-09-10 PROCEDURE — 87176 TISSUE HOMOGENIZATION CULTR: CPT | Performed by: ORTHOPAEDIC SURGERY

## 2019-09-10 PROCEDURE — 27810169 ZZH OR IMPLANT GENERAL: Performed by: ORTHOPAEDIC SURGERY

## 2019-09-10 PROCEDURE — 71000015 ZZH RECOVERY PHASE 1 LEVEL 2 EA ADDTL HR: Performed by: ORTHOPAEDIC SURGERY

## 2019-09-10 PROCEDURE — 99207 ZZC CONSULT E&M CHANGED TO SUBSEQUENT LEVEL: CPT | Performed by: INTERNAL MEDICINE

## 2019-09-10 PROCEDURE — 25000128 H RX IP 250 OP 636: Performed by: NURSE ANESTHETIST, CERTIFIED REGISTERED

## 2019-09-10 PROCEDURE — 87070 CULTURE OTHR SPECIMN AEROBIC: CPT | Performed by: ORTHOPAEDIC SURGERY

## 2019-09-10 PROCEDURE — 36415 COLL VENOUS BLD VENIPUNCTURE: CPT | Performed by: ANESTHESIOLOGY

## 2019-09-10 PROCEDURE — 12000001 ZZH R&B MED SURG/OB UMMC

## 2019-09-10 PROCEDURE — 40000986 XR KNEE PORT LT 1/2 VW: Mod: LT

## 2019-09-10 PROCEDURE — 93010 ELECTROCARDIOGRAM REPORT: CPT | Mod: 59 | Performed by: INTERNAL MEDICINE

## 2019-09-10 DEVICE — BONE CEMENT SIMPLEX W/TOBRAMYCIN 6197-9-001: Type: IMPLANTABLE DEVICE | Site: KNEE | Status: FUNCTIONAL

## 2019-09-10 DEVICE — IMPLANTABLE DEVICE: Type: IMPLANTABLE DEVICE | Site: KNEE | Status: FUNCTIONAL

## 2019-09-10 RX ORDER — FENTANYL CITRATE 50 UG/ML
25-50 INJECTION, SOLUTION INTRAMUSCULAR; INTRAVENOUS
Status: DISCONTINUED | OUTPATIENT
Start: 2019-09-10 | End: 2019-09-10 | Stop reason: HOSPADM

## 2019-09-10 RX ORDER — LIDOCAINE 4 G/G
1 PATCH TOPICAL
Status: DISCONTINUED | OUTPATIENT
Start: 2019-09-11 | End: 2019-09-11 | Stop reason: HOSPADM

## 2019-09-10 RX ORDER — SODIUM CHLORIDE, SODIUM LACTATE, POTASSIUM CHLORIDE, CALCIUM CHLORIDE 600; 310; 30; 20 MG/100ML; MG/100ML; MG/100ML; MG/100ML
INJECTION, SOLUTION INTRAVENOUS CONTINUOUS
Status: DISCONTINUED | OUTPATIENT
Start: 2019-09-10 | End: 2019-09-10 | Stop reason: HOSPADM

## 2019-09-10 RX ORDER — PROCHLORPERAZINE MALEATE 10 MG
10 TABLET ORAL EVERY 6 HOURS PRN
Status: DISCONTINUED | OUTPATIENT
Start: 2019-09-10 | End: 2019-09-11 | Stop reason: HOSPADM

## 2019-09-10 RX ORDER — OXYCODONE HYDROCHLORIDE 5 MG/1
5-10 TABLET ORAL
Status: DISCONTINUED | OUTPATIENT
Start: 2019-09-10 | End: 2019-09-11 | Stop reason: HOSPADM

## 2019-09-10 RX ORDER — SCOLOPAMINE TRANSDERMAL SYSTEM 1 MG/1
1 PATCH, EXTENDED RELEASE TRANSDERMAL ONCE
Status: COMPLETED | OUTPATIENT
Start: 2019-09-10 | End: 2019-09-10

## 2019-09-10 RX ORDER — EPHEDRINE SULFATE 50 MG/ML
INJECTION, SOLUTION INTRAVENOUS PRN
Status: DISCONTINUED | OUTPATIENT
Start: 2019-09-10 | End: 2019-09-10

## 2019-09-10 RX ORDER — GABAPENTIN 100 MG/1
300 CAPSULE ORAL 2 TIMES DAILY
Status: DISCONTINUED | OUTPATIENT
Start: 2019-09-10 | End: 2019-09-11 | Stop reason: HOSPADM

## 2019-09-10 RX ORDER — NALOXONE HYDROCHLORIDE 0.4 MG/ML
.1-.4 INJECTION, SOLUTION INTRAMUSCULAR; INTRAVENOUS; SUBCUTANEOUS
Status: DISCONTINUED | OUTPATIENT
Start: 2019-09-10 | End: 2019-09-10 | Stop reason: HOSPADM

## 2019-09-10 RX ORDER — BISACODYL 10 MG
10 SUPPOSITORY, RECTAL RECTAL DAILY
Status: DISCONTINUED | OUTPATIENT
Start: 2019-09-11 | End: 2019-09-11 | Stop reason: HOSPADM

## 2019-09-10 RX ORDER — CLINDAMYCIN PHOSPHATE 900 MG/50ML
900 INJECTION, SOLUTION INTRAVENOUS EVERY 8 HOURS
Status: COMPLETED | OUTPATIENT
Start: 2019-09-10 | End: 2019-09-11

## 2019-09-10 RX ORDER — CLINDAMYCIN PHOSPHATE 900 MG/50ML
900 INJECTION, SOLUTION INTRAVENOUS SEE ADMIN INSTRUCTIONS
Status: DISCONTINUED | OUTPATIENT
Start: 2019-09-10 | End: 2019-09-10 | Stop reason: HOSPADM

## 2019-09-10 RX ORDER — HYDROXYZINE HYDROCHLORIDE 25 MG/1
25 TABLET, FILM COATED ORAL EVERY 6 HOURS PRN
Status: DISCONTINUED | OUTPATIENT
Start: 2019-09-10 | End: 2019-09-11 | Stop reason: HOSPADM

## 2019-09-10 RX ORDER — FLUMAZENIL 0.1 MG/ML
0.2 INJECTION, SOLUTION INTRAVENOUS
Status: DISCONTINUED | OUTPATIENT
Start: 2019-09-10 | End: 2019-09-10 | Stop reason: HOSPADM

## 2019-09-10 RX ORDER — SODIUM CHLORIDE, SODIUM LACTATE, POTASSIUM CHLORIDE, CALCIUM CHLORIDE 600; 310; 30; 20 MG/100ML; MG/100ML; MG/100ML; MG/100ML
INJECTION, SOLUTION INTRAVENOUS CONTINUOUS
Status: ACTIVE | OUTPATIENT
Start: 2019-09-10 | End: 2019-09-11

## 2019-09-10 RX ORDER — ACETAMINOPHEN 325 MG/1
975 TABLET ORAL EVERY 8 HOURS
Status: DISCONTINUED | OUTPATIENT
Start: 2019-09-11 | End: 2019-09-11 | Stop reason: HOSPADM

## 2019-09-10 RX ORDER — ONDANSETRON 4 MG/1
4 TABLET, ORALLY DISINTEGRATING ORAL EVERY 30 MIN PRN
Status: DISCONTINUED | OUTPATIENT
Start: 2019-09-10 | End: 2019-09-10 | Stop reason: HOSPADM

## 2019-09-10 RX ORDER — ACETAMINOPHEN 325 MG/1
650 TABLET ORAL EVERY 4 HOURS PRN
Status: DISCONTINUED | OUTPATIENT
Start: 2019-09-13 | End: 2019-09-11 | Stop reason: HOSPADM

## 2019-09-10 RX ORDER — PROPOFOL 10 MG/ML
INJECTION, EMULSION INTRAVENOUS CONTINUOUS PRN
Status: DISCONTINUED | OUTPATIENT
Start: 2019-09-10 | End: 2019-09-10

## 2019-09-10 RX ORDER — IPRATROPIUM BROMIDE AND ALBUTEROL SULFATE 2.5; .5 MG/3ML; MG/3ML
3 SOLUTION RESPIRATORY (INHALATION) ONCE
Status: DISCONTINUED | OUTPATIENT
Start: 2019-09-10 | End: 2019-09-11 | Stop reason: HOSPADM

## 2019-09-10 RX ORDER — DIAZEPAM 5 MG
5 TABLET ORAL EVERY 6 HOURS PRN
Status: DISCONTINUED | OUTPATIENT
Start: 2019-09-10 | End: 2019-09-11 | Stop reason: HOSPADM

## 2019-09-10 RX ORDER — IPRATROPIUM BROMIDE AND ALBUTEROL SULFATE 2.5; .5 MG/3ML; MG/3ML
3 SOLUTION RESPIRATORY (INHALATION) ONCE
Status: COMPLETED | OUTPATIENT
Start: 2019-09-10 | End: 2019-09-10

## 2019-09-10 RX ORDER — DEXAMETHASONE SODIUM PHOSPHATE 4 MG/ML
INJECTION, SOLUTION INTRA-ARTICULAR; INTRALESIONAL; INTRAMUSCULAR; INTRAVENOUS; SOFT TISSUE PRN
Status: DISCONTINUED | OUTPATIENT
Start: 2019-09-10 | End: 2019-09-10

## 2019-09-10 RX ORDER — ONDANSETRON 2 MG/ML
4 INJECTION INTRAMUSCULAR; INTRAVENOUS EVERY 6 HOURS PRN
Status: DISCONTINUED | OUTPATIENT
Start: 2019-09-10 | End: 2019-09-11 | Stop reason: HOSPADM

## 2019-09-10 RX ORDER — FEXOFENADINE HCL AND PSEUDOEPHEDRINE HCI 60; 120 MG/1; MG/1
1 TABLET, EXTENDED RELEASE ORAL EVERY 12 HOURS SCHEDULED
Status: DISCONTINUED | OUTPATIENT
Start: 2019-09-10 | End: 2019-09-11 | Stop reason: HOSPADM

## 2019-09-10 RX ORDER — DULOXETIN HYDROCHLORIDE 20 MG/1
20 CAPSULE, DELAYED RELEASE ORAL EVERY MORNING
Status: DISCONTINUED | OUTPATIENT
Start: 2019-09-11 | End: 2019-09-11 | Stop reason: HOSPADM

## 2019-09-10 RX ORDER — MEPERIDINE HYDROCHLORIDE 25 MG/ML
12.5 INJECTION INTRAMUSCULAR; INTRAVENOUS; SUBCUTANEOUS
Status: DISCONTINUED | OUTPATIENT
Start: 2019-09-10 | End: 2019-09-10 | Stop reason: HOSPADM

## 2019-09-10 RX ORDER — HYDROMORPHONE HYDROCHLORIDE 1 MG/ML
.3-.5 INJECTION, SOLUTION INTRAMUSCULAR; INTRAVENOUS; SUBCUTANEOUS
Status: DISCONTINUED | OUTPATIENT
Start: 2019-09-10 | End: 2019-09-11 | Stop reason: HOSPADM

## 2019-09-10 RX ORDER — ACETAMINOPHEN 325 MG/1
975 TABLET ORAL ONCE
Status: DISCONTINUED | OUTPATIENT
Start: 2019-09-10 | End: 2019-09-10 | Stop reason: HOSPADM

## 2019-09-10 RX ORDER — CLINDAMYCIN PHOSPHATE 900 MG/50ML
900 INJECTION, SOLUTION INTRAVENOUS
Status: DISCONTINUED | OUTPATIENT
Start: 2019-09-10 | End: 2019-09-10 | Stop reason: HOSPADM

## 2019-09-10 RX ORDER — GLYCOPYRROLATE 0.2 MG/ML
INJECTION, SOLUTION INTRAMUSCULAR; INTRAVENOUS PRN
Status: DISCONTINUED | OUTPATIENT
Start: 2019-09-10 | End: 2019-09-10

## 2019-09-10 RX ORDER — LIDOCAINE 40 MG/G
CREAM TOPICAL
Status: DISCONTINUED | OUTPATIENT
Start: 2019-09-10 | End: 2019-09-11 | Stop reason: HOSPADM

## 2019-09-10 RX ORDER — ONDANSETRON 4 MG/1
4 TABLET, ORALLY DISINTEGRATING ORAL EVERY 6 HOURS PRN
Status: DISCONTINUED | OUTPATIENT
Start: 2019-09-10 | End: 2019-09-11 | Stop reason: HOSPADM

## 2019-09-10 RX ORDER — AMOXICILLIN 250 MG
1 CAPSULE ORAL 2 TIMES DAILY
Status: DISCONTINUED | OUTPATIENT
Start: 2019-09-10 | End: 2019-09-11 | Stop reason: HOSPADM

## 2019-09-10 RX ORDER — BUPIVACAINE HYDROCHLORIDE 2.5 MG/ML
INJECTION, SOLUTION EPIDURAL; INFILTRATION; INTRACAUDAL PRN
Status: DISCONTINUED | OUTPATIENT
Start: 2019-09-10 | End: 2019-09-10

## 2019-09-10 RX ORDER — AMOXICILLIN 250 MG
2 CAPSULE ORAL 2 TIMES DAILY
Status: DISCONTINUED | OUTPATIENT
Start: 2019-09-10 | End: 2019-09-11 | Stop reason: HOSPADM

## 2019-09-10 RX ORDER — NALOXONE HYDROCHLORIDE 0.4 MG/ML
.1-.4 INJECTION, SOLUTION INTRAMUSCULAR; INTRAVENOUS; SUBCUTANEOUS
Status: DISCONTINUED | OUTPATIENT
Start: 2019-09-10 | End: 2019-09-11 | Stop reason: HOSPADM

## 2019-09-10 RX ORDER — ALBUTEROL SULFATE 90 UG/1
1-2 AEROSOL, METERED RESPIRATORY (INHALATION)
Status: DISCONTINUED | OUTPATIENT
Start: 2019-09-10 | End: 2019-09-11 | Stop reason: HOSPADM

## 2019-09-10 RX ORDER — ONDANSETRON 2 MG/ML
4 INJECTION INTRAMUSCULAR; INTRAVENOUS EVERY 30 MIN PRN
Status: DISCONTINUED | OUTPATIENT
Start: 2019-09-10 | End: 2019-09-10 | Stop reason: HOSPADM

## 2019-09-10 RX ADMIN — SODIUM CHLORIDE 1 G: 9 INJECTION, SOLUTION INTRAVENOUS at 13:52

## 2019-09-10 RX ADMIN — DEXMEDETOMIDINE HYDROCHLORIDE 20 MCG: 100 INJECTION, SOLUTION INTRAVENOUS at 13:08

## 2019-09-10 RX ADMIN — SCOPALAMINE 1 PATCH: 1 PATCH, EXTENDED RELEASE TRANSDERMAL at 12:17

## 2019-09-10 RX ADMIN — EPHEDRINE SULFATE 5 MG: 50 INJECTION, SOLUTION INTRAVENOUS at 14:40

## 2019-09-10 RX ADMIN — BUPIVACAINE HYDROCHLORIDE 20 ML: 2.5 INJECTION, SOLUTION EPIDURAL; INFILTRATION; INTRACAUDAL at 13:08

## 2019-09-10 RX ADMIN — DEXAMETHASONE SODIUM PHOSPHATE 2 MG: 4 INJECTION, SOLUTION INTRAMUSCULAR; INTRAVENOUS at 13:08

## 2019-09-10 RX ADMIN — FENTANYL CITRATE 50 MCG: 50 INJECTION INTRAMUSCULAR; INTRAVENOUS at 13:07

## 2019-09-10 RX ADMIN — PROPOFOL 50 MCG/KG/MIN: 10 INJECTION, EMULSION INTRAVENOUS at 13:55

## 2019-09-10 RX ADMIN — MIDAZOLAM 1 MG: 1 INJECTION INTRAMUSCULAR; INTRAVENOUS at 13:07

## 2019-09-10 RX ADMIN — MIDAZOLAM 2 MG: 1 INJECTION INTRAMUSCULAR; INTRAVENOUS at 15:14

## 2019-09-10 RX ADMIN — FENTANYL CITRATE 25 MCG: 50 INJECTION INTRAMUSCULAR; INTRAVENOUS at 17:40

## 2019-09-10 RX ADMIN — ASPIRIN 325 MG: 325 TABLET, DELAYED RELEASE ORAL at 20:20

## 2019-09-10 RX ADMIN — OXYCODONE HYDROCHLORIDE 5 MG: 5 TABLET ORAL at 17:16

## 2019-09-10 RX ADMIN — ACETAMINOPHEN 650 MG: 325 TABLET ORAL at 17:18

## 2019-09-10 RX ADMIN — IPRATROPIUM BROMIDE AND ALBUTEROL SULFATE 3 ML: .5; 3 SOLUTION RESPIRATORY (INHALATION) at 12:17

## 2019-09-10 RX ADMIN — GABAPENTIN 300 MG: 100 CAPSULE ORAL at 20:20

## 2019-09-10 RX ADMIN — CLINDAMYCIN IN 5 PERCENT DEXTROSE 900 MG: 18 INJECTION, SOLUTION INTRAVENOUS at 20:21

## 2019-09-10 RX ADMIN — SENNOSIDES AND DOCUSATE SODIUM 1 TABLET: 8.6; 5 TABLET ORAL at 20:20

## 2019-09-10 RX ADMIN — GLYCOPYRROLATE 0.2 MG: 0.2 INJECTION, SOLUTION INTRAMUSCULAR; INTRAVENOUS at 14:40

## 2019-09-10 RX ADMIN — SODIUM CHLORIDE, POTASSIUM CHLORIDE, SODIUM LACTATE AND CALCIUM CHLORIDE: 600; 310; 30; 20 INJECTION, SOLUTION INTRAVENOUS at 13:13

## 2019-09-10 ASSESSMENT — ACTIVITIES OF DAILY LIVING (ADL)
DRESS: 0-->INDEPENDENT
TOILETING: 0-->INDEPENDENT
TRANSFERRING: 0-->INDEPENDENT
SWALLOWING: 0-->SWALLOWS FOODS/LIQUIDS WITHOUT DIFFICULTY
RETIRED_COMMUNICATION: 0-->UNDERSTANDS/COMMUNICATES WITHOUT DIFFICULTY
BATHING: 0-->INDEPENDENT
FALL_HISTORY_WITHIN_LAST_SIX_MONTHS: NO
AMBULATION: 0-->INDEPENDENT
COGNITION: 0 - NO COGNITION ISSUES REPORTED
RETIRED_EATING: 0-->INDEPENDENT
ADLS_ACUITY_SCORE: 14
PRIOR_FUNCTIONAL_LEVEL_COMMENT: INDEPENDENT

## 2019-09-10 NOTE — ANESTHESIA PROCEDURE NOTES
Spinal/LP Procedure Note    Spinal Block  Staff:     Anesthesiologist:  Kaylee Bernal MD    Resident/CRNA:  Chris Esteves MD    Spinal/LP performed by resident/CRNA in presence of a teaching physician.    Location: In OR BEFORE Induction  Procedure Start/Stop Times:      9/10/2019 1:15 PM     9/10/2019 1:30 PM    patient identified, IV checked, risks and benefits discussed, informed consent, monitors and equipment checked, pre-op evaluation, at physician/surgeon's request and post-op pain management      Correct Patient: Yes      Correct Position: Yes      Correct Site: Yes      Correct Procedure: Yes      Correct Laterality:  N/A    Site Marked:  N/A  Procedure:     Procedure:  Intrathecal    ASA:  3    Position:  Sitting    Sterile Prep: chloraprep      Insertion site:  L4-5    Approach:  Midline    Local Skin Infiltration:  1% lidocaine    Introducer used: Yes      Attempts:  2    Redirects:  3    CSF:  Clear    Paresthesias:  No    Time injected:  13:28  Assessment/Narrative:     Sensory Level:  L4

## 2019-09-10 NOTE — ANESTHESIA POSTPROCEDURE EVALUATION
Anesthesia POST Procedure Evaluation    Patient: Ashly Paulino   MRN:     [id number] Gender:   female   Age:    58 year old :      1960        Preoperative Diagnosis: Failure Left Knee Replacement   Procedure(s):  Revision Left Total Knee Replacement   Postop Comments: No value filed.       Anesthesia Type:  Not documented  General    Reportable Event: NO     PAIN: Uncomplicated   Sign Out status: Comfortable, Well controlled pain     PONV: No PONV   Sign Out status:  No Nausea or Vomiting     Neuro/Psych: Uneventful perioperative course   Sign Out Status: Preoperative baseline; Age appropriate mentation     Airway/Resp.: Uneventful perioperative course   Sign Out Status: Non labored breathing, age appropriate RR; Resp. Status within EXPECTED Parameters     CV: Uneventful perioperative course   Sign Out status: Appropriate BP and perfusion indices; Appropriate HR/Rhythm     Disposition:   Sign Out in:  PACU  Disposition:  Phase II; Home  Recovery Course: Uneventful  Follow-Up: Not required           Last Anesthesia Record Vitals:  CRNA VITALS  9/10/2019 1541 - 9/10/2019 1641      9/10/2019             Resp Rate (observed):  1  (Abnormal)           Last PACU Vitals:  Vitals Value Taken Time   /69 9/10/2019  4:31 PM   Temp 36.7  C (98.1  F) 9/10/2019  4:20 PM   Pulse 75 9/10/2019  4:31 PM   Resp 15 9/10/2019  4:45 PM   SpO2 100 % 9/10/2019  4:45 PM   Temp src     NIBP     Pulse     SpO2     Resp     Temp     Ht Rate     Temp 2     Vitals shown include unvalidated device data.      Electronically Signed By: Catrachito Alonso DO, September 10, 2019, 4:47 PM

## 2019-09-10 NOTE — OP NOTE
OPERATION REPORT     DATE OF OPERATION: 9/10/2019     SURGEON: Iftikhar Wu MD.     ASSISTANT: Cecilio Fields, Fellow, Layo Marie, Resident.    ANESTHESIOLOGIST: Anesthesiologist: Kaylee Bernal MD  CRNA: Lorie Borges APRN CRNA  Anesthesia Resident: Chris Esteves MD    ANESTHESIA: General    PREOPERATIVE DIAGNOSIS: Persistent pain TKA left with possible loosening patellar component.    POSTOPERATIVE DIAGNOSIS: Loose patellar component.  Well fixed femoral and tibial component total knee arthroplasty left.    OPERATION(S) PERFORMED: Partial revision, 2 components, of left total knee arthroplasty. revision patella component and liner exchange total knee arthroplasty left.  Lateral release.    BACKGROUND:  58-year-old female with a painful total knee arthroplasty performed 3 years ago at an outside facility.  Currently no concern for infection. Possible an aseptic loosening of the patellar implant.     OPERATIVE FINDINGS: Loose patellar component.  Well fixed femoral and tibial component total knee arthroplasty left.    OPERATIVE PROCEDURE: After informed consent was obtained, the patient brought to operating room on 9/10/2019. Spinal and Block were performed by the department of anesthesiology. The patient was sedated and then placed supine on the OR table with all bony prominences appropriately padded. A nonsterile tourniquet was placed as proximal as possible. Surgical pause was instituted with agreement between surgeon anesthesia and nursing that the Left knee was the correct operative site.  The operative lower extremity was prepped and draped in usual sterile fashion. Midline incision through the old scar was made, went sharply through skin and subcutaneous tissues. A medial arthrotomy was made through the quadriceps tendon.  5 tissue specimens were taken for culture aerobic and anaerobic.  A partial synovectomy was performed and the patella was everted.  The patellar component  was loose and easily removed.  The cement mantle was removed with a combination of osteotomes and Midas Adarsh.  Copious irrigation of the patella.  Multiple large cysts were debrided.  Then the polyethylene wear was removed and with a 10 mm trial posterior stabilized insert the tracking of the patella was evaluated.  There was a slight tendency to lateralization of the patella during flexion.  Therefore it was decided to place a oval patellar component and a lateral release was performed.  Once this was done the patella tracked adequately and without tendency to subluxate.  The definitive 10 mm posterior stabilized liner was put in place.  The 32 mm oval patellar component was cemented in place.  Excess cement was removed. A 50 cc dose of the anesthetic cocktail using bupicaine, ketorolac, and morphine was injected into the anterior capsule, medial and lateral gutters with care taken to avoid intravascular injection.  Wound closure was performed using interrupted figure-of-8 0-Ethibond sutures for arthrotomy and quadriceps, 2-0 vicryl for subcutaneous tissue, and 3-0 PDS running subcuticular skin closure.  Dressing was applied in the usual sterile fashion..    ESTIMATED BLOOD LOSS: 100 mL.     TOURNIQUET TIME: 94 min    POSTOPERATIVE PLAN: is standard total knee protocol.  Wounds were dressed in usual sterile fashion. The patient was awakened from sedation and sent to PACU in stable condition.      Activity: Up with assist.  Weight bearing status: AT   Antibiotics: clindamycin until discharge x 24 hours.  Diet: Begin with clear fluids and progress diet as tolerated.  Bowel regimen. Anti-emetics PRN.    DVT prophylaxis: Asparin 325 BID for 4 weeks   Dressing: leave in place for 2-3 days post op  Bracing/Splinting: none  Drains:-  Cultures: Follow possible growth.  Pain management: transition from IV to orals as tolerated.    Physical Therapy/Occupational Therapy  Consults: IM / Hospitalist  Follow-up: Clinic with   [facility] in 4 weeks, x-rays needed.  FINAL IMPLANTS:   Implant Name Type Inv. Item Serial No.  Lot No. LRB No. Used   BONE CEMENT SIMPLEX W/TOBRAMYCIN 6197-9-001 Cement, Bone BONE CEMENT SIMPLEX W/TOBRAMYCIN 6197-9-001  [employer] [id number] Left 1   IMP COMP KNEE S&amp;N LEGION PS HI-FLEX XLPE SZ3-4 10MM [id number] Total Joint Component/Insert IMP COMP KNEE S&amp;N LEGION PS HI-FLEX XLPE SZ3-4 10MM [id number]  [employer]  [id number] Left 1   IMP COMP PATELLA ELOINA II 9X35MM [id number]  IMP COMP PATELLA ELOINA II 9X35MM [id number]  [employer] [id number] Left 1   articular insert    [employer] [id number] Left 1   IMP COMP PATELLA S&amp;N UHMWPE 32X9MM OVAL [id number] Total Joint Component/Insert IMP COMP PATELLA S&amp;N UHMWPE 32X9MM OVAL [id number]  [employer]  [id number] Left 1       ATTESTATION: Dr Wu was present at all critical portions of this case and immediately available at all times during the duration of the case.     Signed:  Cecilio Fields MD 9/10/2019 4:05 PM        Attending MD (Dr. Iftikhar Wu) Attestation:  I was present during the key portions of the procedure and I was immediately available for the entire procedure between opening and closing.    Iftikhar Wu MD  [facility] Family Professor  Oncology and Adult Reconstructive Surgery  Dept Orthopaedic Surgery, [facility]

## 2019-09-10 NOTE — ANESTHESIA CARE TRANSFER NOTE
Patient: Ashly Paulino    Procedure(s):  Revision Left Total Knee Replacement    Diagnosis: Failure Left Knee Replacement  Diagnosis Additional Information: No value filed.    Anesthesia Type:   General     Note:  Airway :Face Mask  Patient transferred to:PACU  Handoff Report: Identifed the Patient, Identified the Reponsible Provider, Reviewed the pertinent medical history, Discussed the surgical course, Reviewed Intra-OP anesthesia mangement and issues during anesthesia, Set expectations for post-procedure period and Allowed opportunity for questions and acknowledgement of understanding      Vitals: (Last set prior to Anesthesia Care Transfer)    CRNA VITALS  9/10/2019 1541 - 9/10/2019 1622      9/10/2019             Resp Rate (observed):  1  (Abnormal)                 Electronically Signed By: JAZZY Burrell CRNA  September 10, 2019  4:22 PM

## 2019-09-10 NOTE — ANESTHESIA PROCEDURE NOTES
Peripheral Nerve Block Procedure Note    Staff:     Anesthesiologist:  Stephen Oates MD    Resident/CRNA:  Demian Gutierrez MD    Block performed by resident/CRNA in the presence of a teaching physician    Location: Pre-op  Procedure Start/Stop TImes:     patient identified, IV checked, site marked, risks and benefits discussed, informed consent, monitors and equipment checked, pre-op evaluation, at physician/surgeon's request and post-op pain management      Correct Patient: Yes      Correct Position: Yes      Correct Site: Yes      Correct Procedure: Yes      Correct Laterality:  Yes    Site Marked:  Yes  Procedure details:     Procedure:  Adductor canal    Laterality:  Left    Position:  Supine    Sterile Prep: chloraprep, mask and sterile gloves      Needle:  Short bevel    Needle gauge:  21    Needle length (mm):  100    Ultrasound: Yes      Ultrasound used to identify targeted nerve, plexus, or vascular structure and placed a needle adjacent to it      Permanent Image entered into patiient's record      Abnormal pain on injection: No      Blood Aspirated: No      Paresthesias:  No    Bleeding at site: No      Test dose negative for signs of intravascular injection: Yes      Bolus via:  Needle    Infusion Method:  Single Shot    Blood aspirated via catheter: No      Complications:  None  Assessment/Narrative:     Injection made incrementally with aspirations every (mL):  5

## 2019-09-10 NOTE — CONSULTS
[facility], [place]  Consult Note - Hospitalist Service     Date of Admission:  9/10/2019  Consult Requested by: Layo Mays MD  Reason for Consult: Post operative co-management     Assessment & Plan   Ashly Paulino is a 58 year old female admitted on 9/10/2019. She underwent revision Left total knee replacement. She has known medical history of HTN and depression. Internal Medicine consulted for postoperative co-management  Individual problems and their management are outlined below      # S/P left Revision Knee arthroplasty:  Management by primary team. Please see their notes for further details  Stop IV fluids in 6 hrs as patient is tolerating diet   Pain control with acetaminophen 975 mg every 8 hrs for 3 days, Oxycodone 5-10 mg every 3 hrs PRN and IV hydromorphone 0.3-0.5 mg q 3 hrs for breakthrough pain   DVT prophylaxis with  SCDs while in hospital,325 mg aspirin BID x4 weeks total.  Post op antibiotics ( clindamycin) as per primary team. Generally these are continued atleast for 2-3 days till intraoperative cultures are back   PT/OT as per protocol  Post operative capnography monitoring    Incentive spirometry and aggressive bowel regimen  We will monitor for acute blood loss anemia. Pre op Hgb at 14.6    HTN:  Stable  Hold home dose of losartan and resume after BMP check in the morning      Depression:  Stable  Resume home dose of duloxetine       Allergy:   Resume home dose of fexofenadine       The patient's care was discussed with the Bedside Nurse and Patient.    Agustina Gusman MD  [facility], [place]    ______________________________________________________________________    Chief Complaint   S/P Revision left total Knee replacement     History is obtained from the patient, pre-op physical and perioperative notes     History of Present Illness   Ashly Paulino is a 58 year old female who underwent revision left knee  replacement procedure today.  The procedure was done because she had continued pain in her left knee following a knee replacement that was done 3 years ago at an outside facility.    The procedure itself was uneventful with estimated blood loss of 100 mL.  Patient received about 1 L of Ringer lactate solution.  Postoperatively patient recovered well.  She underwent the above procedure under spinal anesthesia.    I met patient up on floor 8 AM which she was recovering comfortably she denies any chest pain or shortness of breath.  She denies any fevers or chills.  She denies any nausea vomiting or diarrhea.  She was hoping to have dinner and told me that her  was bringing food in.  Her mood is very pleasant.    We also went through her medications and past medical history.  She does not have a history of asthma and has not used the Advair that list that is listed on her prescription.  She does have allergies and uses fexofenadine regularly.    Review of Systems   The 10 point Review of Systems is negative other than noted in the HPI   Past Medical History    I have reviewed this patient's medical history and updated it with pertinent information if needed.   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  Past Surgical History:   Procedure Laterality Date     ARTHROPLASTY KNEE Left 10/31/2016    Procedure: ARTHROPLASTY KNEE;  Surgeon: Gregory Tran MD;  Location:  [facility] OR     C SHOULDER SURG PROC UNLISTED  16    Right Rotator Cuff repair     C STOMACH SURGERY PROCEDURE UNLISTED        SECTION        SECTION, IMMEDIATE HYSTERECTOMY, COMBINED      triplets     COLONOSCOPY       ENT SURGERY      septoplasty     esophogastroduodenoscopy       KNEE SURGERY  10/31/16    lateral knee discomfort begins      MYOMECTOMY UTERUS       OOPHORECTOMY Right      ORTHOPEDIC SURGERY      lt rotator cuff repair     partial lateral meniscus repair      rt     pr lateral retinacular release open       rt lateral meniscectomy and cyst removal         Social History   I have reviewed this patient's social history and updated it with pertinent information if needed.  Social History     Tobacco Use     Smoking status: Never Smoker     Smokeless tobacco: Never Used   Substance Use Topics     Alcohol use: Yes     Comment: 1-2 weekly     Drug use: Yes     Types: Marijuana     Comment: rare marijuana  smoking       Family History   I have reviewed this patient's family history and updated it with pertinent information if needed.   Family History   Problem Relation Age of Onset     Coronary Artery Disease Father               Coronary Artery Disease Brother               Hypertension Mother         98 years old/passed away at 99 years 2019     Hyperlipidemia Mother         98 years old/98 years old     Osteoporosis Mother         (2) knees, (1) hip/(2) knees, (1) hip     No Known Problems Sister      Osteoarthritis Sister        Medications   Medications Prior to Admission   Medication Sig Dispense Refill Last Dose     Acetaminophen (TYLENOL PO) Take 650-1,300 mg by mouth daily as needed    9/10/2019 at 0900     albuterol (PROVENTIL HFA) 108 (90 Base) MCG/ACT inhaler Inhale 1-2 puffs into the lungs as needed    9/10/2019 at 0900     Celecoxib (CELEBREX PO) Take 200 mg by mouth every morning    Past Week at Unknown time     CREAM BASE EX Externally apply  topically as needed (Active Cream)   Past Week at Unknown time     DULoxetine (CYMBALTA) 20 MG capsule Take 20 mg by mouth every morning    9/9/2019 at 0900     fexofenadine-pseudoePHEDrine (ALLEGRA-D)  MG 12 hr tablet Take 1 tablet by mouth as needed    9/10/2019 at 0900     Ibuprofen (ADVIL PO) Take 400 mg by mouth daily as needed    Past Month at Unknown time     losartan (COZAAR) 50 MG tablet Take 75 mg by mouth every morning    9/9/2019 at 0900     fluticasone-salmeterol (ADVAIR) 250-50 MCG/DOSE inhaler Inhale 1 puff into the lungs as needed   More than a month at Unknown time       Allergies   Allergies   Allergen Reactions     Blood Transfusion Related (Informational Only) Other (See Comments)     Patient has a history of a clinically significant antibody against RBC antigens.  A delay in compatible RBCs may occur.     Diphenhydramine Swelling and Shortness Of Breath     throat     Iohexol Other (See Comments)     Serious severe headache, delayed response but brought her to ER  Other reaction(s): Headache  Serious severe headache, delayed response but brought her to ER  Serious severe headache, delayed response but brought her to ER       Lisinopril Cough     Morphine Other (See Comments)     Respiratory rate dropped seriously     Soap Hives     Nine years ago with surgery, does OK with hibiclens.     Amoxicillin Rash     Augmentin Rash       Physical Exam   Vital Signs: Temp: 97.9  F (36.6  C) Temp src: Oral BP: 118/74 Pulse: 84 Heart Rate: 95 Resp: 14 SpO2: 97 % O2 Device: Nasal cannula Oxygen Delivery: 2 LPM  Weight: 0 lbs 0 oz    Constitutional: awake, alert, cooperative, no apparent distress, and appears stated age  Eyes: Lids and lashes normal, pupils equal, round and reactive to light, extra ocular muscles intact, sclera clear, conjunctiva normal  ENT: Normocephalic, without obvious abnormality, atraumatic, sinuses nontender on palpation, external ears without lesions, oral pharynx with moist  mucous membranes.  Respiratory: No increased work of breathing, good air exchange, clear to auscultation bilaterally, no crackles or wheezing  Cardiovascular: Normal apical impulse, regular rate and rhythm, normal S1 and S2, no S3 or S4, and no murmur noted  GI: No scars, normal bowel sounds, soft, non-distended, non-tender, no masses palpated, no hepatosplenomegally  Skin: no bruising or bleeding  Musculoskeletal: Left knee covered with dressing. On an CPM machine. No distal neurovascular deficits   Neurologic: Awake, alert, oriented to name, place and time.  Cranial nerves II-XII are grossly intact.  Motor is 5 out of 5 bilaterally.       Data   Results for orders placed or performed during the hospital encounter of 09/10/19 (from the past 24 hour(s))   POC US Guidance Needle Placement    Impression    Left adductor canal nerve block   Glucose by meter   Result Value Ref Range    Glucose 108 (H) 70 - 99 mg/dL   XR Knee Port Left 1/2 Views    Narrative    2 views left knee radiographs 9/10/2019 5:12 PM    History: Status post TKA    Comparison: CT knee 7/15/2019    Findings:    AP and lateral views of the left knee were obtained. Status post left  total knee arthroplasty. Surgical hardware appears in good position  and alignment. Subcutaneous emphysema and small joint effusion,  postoperative. No acute osseous abnormality. Mild soft tissue  swelling.      Impression    Impression:  1. Status post left total knee arthroplasty, hardware in good position  and alignment.  2. Small joint effusion and subcutaneous emphysema, postoperative.

## 2019-09-10 NOTE — OR NURSING
PACU to Inpatient Nursing Handoff    Patient Ashly Paulino is a 58 year old female who speaks English.   Procedure Procedure(s):  Revision Left Total Knee Replacement   Surgeon(s) Primary: Iftikhar Wu MD  Resident - Assisting: Layo Dejesus MD  Fellow - Assisting: Cecilio Fields MD     Allergies   Allergen Reactions     Blood Transfusion Related (Informational Only) Other (See Comments)     Patient has a history of a clinically significant antibody against RBC antigens.  A delay in compatible RBCs may occur.     Diphenhydramine Swelling and Shortness Of Breath     throat     Iohexol Other (See Comments)     Serious severe headache, delayed response but brought her to ER  Other reaction(s): Headache  Serious severe headache, delayed response but brought her to ER  Serious severe headache, delayed response but brought her to ER       Lisinopril Cough     Morphine Other (See Comments)     Respiratory rate dropped seriously     Soap Hives     Nine years ago with surgery, does OK with hibiclens.     Amoxicillin Rash     Augmentin Rash       Isolation  [unfilled]     Past Medical History   has a past medical history of Adjustment disorder, Allergic rhinitis, Backache, Blood transfusion reaction, Degenerative joint disease (2014), Depression, GERD (gastroesophageal reflux disease), Insomnia, Neck injuries, Tinnitus, Tinnitus, unspecified laterality, and Uncomplicated asthma.    Anesthesia General   Dermatome Level Dermatomes Left: S1  Dermatomes Right: S1   Preop Meds scopolamine patch  duoneb given at 1217 - time given: fentanyl 50mcg & versed 1mg @ 1307   Nerve block Adductor canal.  Location:left. Med:bupivacaine. Time given: 1340   Intraop Meds dexamethasone (Decadron)  dexmedetomidine (Precedex): 20 mcg total  versed 2mg 1514   Local Meds Yes - Local Cocktail (morphine, ropivacaine, epinephrine, Toradol) and NO MORPHINE in cocktail   Antibiotics clindamycin (Cleocin) - last given at  approximately 1320 per CRNA, resident gave & did not chart in MAR.     Pain Patient Currently in Pain: yes  Comfort: comfortably manageable  Pain Control: partially effective   PACU meds  acetaminophen (Tylenol): 650 mg (total dose) last given at 1718   fentanyl (Sublimaze): 25 mcg (total dose) last given at 1740   oxycodone (Roxicodone): 5 mg (total dose) last given at 1716    PCA / epidural No   Capnography     Telemetry ECG Rhythm: Normal sinus rhythm   Inpatient Telemetry Monitor Ordered? No        Labs Glucose Lab Results   Component Value Date    GLC 96 09/06/2019       Hgb Lab Results   Component Value Date    HGB 12.0 11/02/2016       INR Lab Results   Component Value Date    INR 1.50 11/14/2008      PACU Imaging Completed     Wound/Incision Incision/Surgical Site 10/31/16 Left Knee (Active)   Number of days: 1044       Incision/Surgical Site 09/10/19 Left Knee (Active)   Closure Sutures 9/10/2019  4:20 PM   Incision Drainage Amount None 9/10/2019  4:30 PM   Dressing Intervention Clean, dry, intact 9/10/2019  4:30 PM   Number of days: 0      CMS        Equipment ice pack and continuous passive motion machine (CPM)   Other LDA       IV Access Peripheral IV 09/10/19 Left Hand (Active)   Site Assessment WDL 9/10/2019  4:20 PM   Line Status Infusing 9/10/2019  4:20 PM   Phlebitis Scale 0-->no symptoms 9/10/2019  4:20 PM   Infiltration Scale 0 9/10/2019 12:20 PM   Infiltration Site Treatment Method  None 9/10/2019 12:20 PM   Extravasation? No 9/10/2019 12:20 PM   Dressing Intervention New dressing  9/10/2019 12:20 PM   Number of days: 0      Blood Products Not applicable  mL   Intake/Output Date 09/10/19 0700 - 09/11/19 0659   Shift 9075-4404 4101-9826 0499-9847 24 Hour Total   INTAKE   I.V. 700 500  1200   Shift Total 700 500  1200   OUTPUT   Urine  500  500   Blood  100  100   Shift Total  600  600   Weight (kg)          Drains / Dasilva Urethral Catheter Latex 16 fr (Active)   Tube Description UTV  9/10/2019  4:20 PM   Collection Container Standard;Patent 9/10/2019  4:20 PM   Securement Method Securing device (Describe) 9/10/2019  4:20 PM   Number of days: 0      Time of void PreOp Void Prior to Procedure: 1140 (09/10/19 1142)    PostOp      Diapered? No   Bladder Scan     PO    nothing by mouth at present.     Vitals    B/P: 119/65  T: 98.1  F (36.7  C)    Temp src: Axillary  P:  Pulse: 76 (09/10/19 1645)    Heart Rate: 79 (09/10/19 1645)     R: 15  O2:  SpO2: 99 %    O2 Device: Nasal cannula (09/10/19 1645)    Oxygen Delivery: 2 LPM (09/10/19 1645)         Family/support present significant other   Patient belongings     Patient transported on bed   DC meds/scripts (obs/outpt) Not applicable   Inpatient Pain Meds Released? Yes       Special needs/considerations Patient is allergic to morphine (pronounced respiratory depression), suggest low dosing with narcotics to  sensitivity.  Also of note, patient does have osborn catheter.   Tasks needing completion None       Denise Forte RN  [facility] [id number]

## 2019-09-10 NOTE — BRIEF OP NOTE
[facility], [place]    Brief Operative Note    Pre-operative diagnosis: Revision left total knee knee patellar component and polyethylene liner.  Post-operative diagnosis same  Procedure: Procedure(s):  Revision Left Total Knee Replacement  Surgeon: Surgeon(s) and Role:     * Iftikhar Wu MD - Primary     * Layo Dejesus MD - Resident - Assisting     * Cecilio Fields MD - Fellow - Assisting  Anesthesia: Spinal and sedation  Estimated blood loss: Less than 100 ml  Drains: None  Specimens:   ID Type Source Tests Collected by Time Destination   1 : left knee #1 Fluid Knee, Left ANAEROBIC BACTERIAL CULTURE, FLUID CULTURE AEROBIC BACTERIAL Iftikhar Wu MD 9/10/2019  2:07 PM    2 : Left Knee #2 Tissue Knee, Left ANAEROBIC BACTERIAL CULTURE, TISSUE CULTURE AEROBIC BACTERIAL Iftikhar Wu MD 9/10/2019  2:13 PM    3 : Left Knee #3 Tissue Knee, Left ANAEROBIC BACTERIAL CULTURE, TISSUE CULTURE AEROBIC BACTERIAL Iftikhar Wu MD 9/10/2019  2:15 PM    4 : Left Knee #4 Tissue Knee, Left ANAEROBIC BACTERIAL CULTURE, TISSUE CULTURE AEROBIC BACTERIAL Iftikhar Wu MD 9/10/2019  2:39 PM    5 : Left Knee #5 Tissue Knee, Left ANAEROBIC BACTERIAL CULTURE, TISSUE CULTURE AEROBIC BACTERIAL Iftikhar Wu MD 9/10/2019  3:44 PM    6 : Explanted Left Total Knee Components; Patella with Ring and Articular Insert Other (specify in comments) Other LABORATORY MISCELLANEOUS ORDER Iftikhar Wu MD 9/10/2019  3:28 PM      Findings: Loosening of patellar component.  Femoral and tibial component well fixed. Uncomplicated procedure.                Complications: None.  Implants:    Implant Name Type Inv. Item Serial No.  Lot No. LRB No. Used   BONE CEMENT SIMPLEX W/TOBRAMYCIN 6197-9-001 Cement, Bone BONE CEMENT SIMPLEX W/TOBRAMYCIN 6197-9-001  [employer] [id number] Left 1   IMP COMP KNEE S&amp;N LEGION PS HI-FLEX XLPE SZ3-4 10MM [id number] Total Joint  Component/Insert IMP COMP KNEE S&amp;N LEGION PS HI-FLEX XLPE SZ3-4 10MM [id number]  LEONARD  [id number] Left 1                       articular insert    LEONARD [employer]; [employer] [id number] Left 1   IMP COMP PATELLA S&amp;N UHMWPE 32X9MM OVAL [id number] Total Joint Component/Insert IMP COMP PATELLA S&amp;N UHMWPE 32X9MM OVAL [id number]  [place]  [id number] Left 1      Postoperative plan:      Activity: Up with assist.  Weight bearing status: AT   Antibiotics: clindamycin until discharge x 24 hours.  Diet: Begin with clear fluids and progress diet as tolerated.  Bowel regimen. Anti-emetics PRN.    DVT prophylaxis: Asparin 325 BID for 4 weeks   Dressing: leave in place for 2-3 days post op  Bracing/Splinting: none  Drains:-  Cultures: Follow possible growth.  Pain management: transition from IV to orals as tolerated.    Physical Therapy/Occupational Therapy  Consults: IM / Hospitalist  Follow-up: Clinic with Dr. Wu in 4 weeks, x-rays needed.

## 2019-09-11 ENCOUNTER — PATIENT OUTREACH (OUTPATIENT)
Dept: CARE COORDINATION | Facility: CLINIC | Age: 59
End: 2019-09-11

## 2019-09-11 ENCOUNTER — APPOINTMENT (OUTPATIENT)
Dept: PHYSICAL THERAPY | Facility: CLINIC | Age: 59
End: 2019-09-11
Attending: ORTHOPAEDIC SURGERY
Payer: COMMERCIAL

## 2019-09-11 VITALS
RESPIRATION RATE: 18 BRPM | SYSTOLIC BLOOD PRESSURE: 140 MMHG | OXYGEN SATURATION: 96 % | TEMPERATURE: 96.9 F | DIASTOLIC BLOOD PRESSURE: 85 MMHG | HEART RATE: 72 BPM

## 2019-09-11 LAB
ANION GAP SERPL CALCULATED.3IONS-SCNC: 7 MMOL/L (ref 3–14)
BUN SERPL-MCNC: 16 MG/DL (ref 7–30)
CALCIUM SERPL-MCNC: 8.4 MG/DL (ref 8.5–10.1)
CHLORIDE SERPL-SCNC: 108 MMOL/L (ref 94–109)
CO2 SERPL-SCNC: 26 MMOL/L (ref 20–32)
CREAT SERPL-MCNC: 0.6 MG/DL (ref 0.52–1.04)
GFR SERPL CREATININE-BSD FRML MDRD: >90 ML/MIN/{1.73_M2}
GLUCOSE SERPL-MCNC: 152 MG/DL (ref 70–99)
HGB BLD-MCNC: 12.7 G/DL (ref 11.7–15.7)
INTERPRETATION ECG - MUSE: NORMAL
POTASSIUM SERPL-SCNC: 4 MMOL/L (ref 3.4–5.3)
SODIUM SERPL-SCNC: 141 MMOL/L (ref 133–144)

## 2019-09-11 PROCEDURE — 80048 BASIC METABOLIC PNL TOTAL CA: CPT | Performed by: STUDENT IN AN ORGANIZED HEALTH CARE EDUCATION/TRAINING PROGRAM

## 2019-09-11 PROCEDURE — 25000125 ZZHC RX 250: Performed by: STUDENT IN AN ORGANIZED HEALTH CARE EDUCATION/TRAINING PROGRAM

## 2019-09-11 PROCEDURE — 85018 HEMOGLOBIN: CPT | Performed by: STUDENT IN AN ORGANIZED HEALTH CARE EDUCATION/TRAINING PROGRAM

## 2019-09-11 PROCEDURE — 36415 COLL VENOUS BLD VENIPUNCTURE: CPT | Performed by: STUDENT IN AN ORGANIZED HEALTH CARE EDUCATION/TRAINING PROGRAM

## 2019-09-11 PROCEDURE — 97161 PT EVAL LOW COMPLEX 20 MIN: CPT | Mod: GP

## 2019-09-11 PROCEDURE — 25000132 ZZH RX MED GY IP 250 OP 250 PS 637: Performed by: INTERNAL MEDICINE

## 2019-09-11 PROCEDURE — 97530 THERAPEUTIC ACTIVITIES: CPT | Mod: GP

## 2019-09-11 PROCEDURE — 97110 THERAPEUTIC EXERCISES: CPT | Mod: GP

## 2019-09-11 PROCEDURE — 25000132 ZZH RX MED GY IP 250 OP 250 PS 637: Performed by: STUDENT IN AN ORGANIZED HEALTH CARE EDUCATION/TRAINING PROGRAM

## 2019-09-11 RX ORDER — OXYCODONE HYDROCHLORIDE 5 MG/1
5-10 TABLET ORAL
Qty: 40 TABLET | Refills: 0 | Status: SHIPPED | OUTPATIENT
Start: 2019-09-11 | End: 2024-07-08

## 2019-09-11 RX ORDER — ASPIRIN 325 MG
325 TABLET, DELAYED RELEASE (ENTERIC COATED) ORAL 2 TIMES DAILY
Qty: 60 TABLET | Refills: 0 | Status: SHIPPED | OUTPATIENT
Start: 2019-09-11 | End: 2019-10-11

## 2019-09-11 RX ORDER — AMOXICILLIN 250 MG
2 CAPSULE ORAL 2 TIMES DAILY
Qty: 30 TABLET | Refills: 0 | Status: SHIPPED | OUTPATIENT
Start: 2019-09-11 | End: 2024-07-08

## 2019-09-11 RX ADMIN — SENNOSIDES AND DOCUSATE SODIUM 2 TABLET: 8.6; 5 TABLET ORAL at 07:59

## 2019-09-11 RX ADMIN — CLINDAMYCIN IN 5 PERCENT DEXTROSE 900 MG: 18 INJECTION, SOLUTION INTRAVENOUS at 05:14

## 2019-09-11 RX ADMIN — OXYCODONE HYDROCHLORIDE 5 MG: 5 TABLET ORAL at 07:58

## 2019-09-11 RX ADMIN — ACETAMINOPHEN 975 MG: 325 TABLET, FILM COATED ORAL at 02:03

## 2019-09-11 RX ADMIN — OXYCODONE HYDROCHLORIDE 10 MG: 5 TABLET ORAL at 10:58

## 2019-09-11 RX ADMIN — ASPIRIN 325 MG: 325 TABLET, DELAYED RELEASE ORAL at 07:58

## 2019-09-11 RX ADMIN — DULOXETINE HYDROCHLORIDE 20 MG: 20 CAPSULE, DELAYED RELEASE ORAL at 08:03

## 2019-09-11 RX ADMIN — FEXOFENADINE HYDROCHLORIDE AND PSEUDOEPHEDRINE HYDROCHLORIDE 1 TABLET: 60; 120 TABLET, FILM COATED, EXTENDED RELEASE ORAL at 08:02

## 2019-09-11 RX ADMIN — ACETAMINOPHEN 975 MG: 325 TABLET, FILM COATED ORAL at 10:57

## 2019-09-11 RX ADMIN — GABAPENTIN 300 MG: 100 CAPSULE ORAL at 07:58

## 2019-09-11 ASSESSMENT — ACTIVITIES OF DAILY LIVING (ADL)
ADLS_ACUITY_SCORE: 12

## 2019-09-11 NOTE — PLAN OF CARE
Discharge Planner OT   Patient plan for discharge: Unknown  Current status: Per PT, patient is close to baseline and does not have OT needs.  Barriers to return to prior living situation: Defer to PT  Recommendations for discharge: Defer to PT  Rationale for recommendations: No inpatient or home OT needs indicated per PT; orders completed.       Entered by: Maria Elena Bucio 09/11/2019 9:34 AM

## 2019-09-11 NOTE — PLAN OF CARE
VS: VSS   O2: >90% on room air    Output: Voiding spontaneously and hydrating well    Last BM: 9/9/19 per pt report    Activity: Up with SBA and crutches, CPM up to 90 degree flexion     Up for meals? In chair    Skin: CDI ex L knee incision, scolpamine patch removed prior to discharge. No patches in place.     Pain: Well controlled - therapies premedicated. PRN oxycodone and scheduled tylenol, pt report of block wearing off prior to discharge and has controlled pain with movement    CMS: intact   Dressing: CDI    Diet: Regular    LDA: PIV SL - to be removed prior to discharge    Equipment: IV pump and pole, crutches    Plan: Discharge to home today - discharge instructions reviewed with pt and pt stated understanding and no further questions. Pt has pt belongings and medications and spouse is providing ride to home    Additional Info: CPM being sent home with pt

## 2019-09-11 NOTE — PLAN OF CARE
08:35   Admitted to 830 from PACU.    Oriented to room, post op routine  CPM on @ 0-30 at time of admission, pt had increased to 50 by 19:30.  CAPNO on.

## 2019-09-11 NOTE — PROGRESS NOTES
Orthopaedic Surgery Progress Note:  09/11/2019     Subjective:   Postoperative day 1.  No acute issues overnight.  Patient has been using the CPM machine.  Pain has been well controlled.  We discussed that we only had to revise the patellar component, all other implants were stable in good position.  Has tolerated the CPM machine well.  We discussed discharge home in the afternoon after working with PT, patient agreeable with this plan.  Denies chest pain, shortness of breath, fevers or chills.    Objective:   BP (!) 140/85 (BP Location: Left arm)   Pulse 72   Temp 96.9  F (36.1  C) (Oral)   Resp 18   SpO2 96%   No intake/output data recorded.  Gen: NAD. Resting comfortably in bed  Resp: Breathing comfortably  Drain:   No drain    Musculoskeletal:   LLE: Dressings are clean, dry, and intact.  There is no evidence of erythema or fluctuance surrounding the surgical field.  The patient can fire TA, GSC, FHL, and EHL.  Sensation is intact over the superficial peroneal, deep peroneal, sural, saphenous, and tibial nerve distributions.  The extremities warm and well-perfused.    Labs:  Lab Results   Component Value Date    WBC 5.9 09/10/2019    HGB 13.2 09/10/2019     09/10/2019    INR 1.50 (H) 11/14/2008        Assessment & Plan:   Ashly Paulino is a 58 year old female status post revision of patellar component on September 10 of 2019 for total knee arthroplasty previously, doing well.    Activity: Up with assist.  Weight bearing status: AT   Antibiotics: clindamycin until discharge x 24 hours.  Diet: Begin with clear fluids and progress diet as tolerated.  Bowel regimen. Anti-emetics PRN.    DVT prophylaxis: Asparin 325 BID for 4 weeks   Dressing: leave in place for 2-3 days post op  Bracing/Splinting: none  Drains:-  Cultures: Follow possible growth.  Pain management: transition from IV to orals as tolerated.    Physical Therapy/Occupational Therapy  Consults: IM / Hospitalist  Follow-up: Clinic with  Dr. Wu in 4 weeks, x-rays needed.    Orthopaedics surgery staff for this patient is Dr. Iftikhar Wu.    ------------------------------------------------------------------------------------------      [x] Post xrays done.  [x] Discharge orders for home, done.  [x] Discharge summary started.    Layo Olivarez MD  Orthopaedic Surgery, PGY-4  Pager: [phone]        FOLLOWUP:    Future Appointments   Date Time Provider Department Center   9/11/2019  8:30 AM Katrin Ortiz, PT PATY [place]   9/11/2019 10:00 AM Maria Elena Bucio OT UROT [place]   9/11/2019  1:00 PM [facility]

## 2019-09-11 NOTE — DISCHARGE SUMMARY
[facility], [place]  Orthopaedic Discharge Summary    Patient: Ashly Paulino MRN# [id number]   Age/Sex: 58 year old female YOB: 1960      Date of Admission:  9/10/2019  Date of Discharge:  9/11/2019 12:00 PM  Admitting Physician:  Iftikhar Wu MD  Discharge Physician:  Layo Dejesus MD  Primary Care Provider:  Ashly Montano  Discharge location         Home    DISCHARGE DIAGNOSIS:    Failure Left Knee Replacement    PROCEDURE(S):   9/10/2019 Procedure(s):  Revision Left Total Knee Replacement     BRIEF HISTORY: (Copied from Dr. Okeefe's note on August 19, 2019.)  Ashly Paulino is a 57 year old female who presents for an evaluation regarding recurrent left knee pain. I last evaluated the patient on 7/15/19 at which time she reported worsening left knee pain following a left TKA in 2016. We elected to obtain a repeat CT scan of her left knee for further evaluation.  She obtained a CT scan and is here for discussion.     Today, the patient reports continued pain in both of her knees left much worse than right.  Symptoms have worsened since last time I saw her, currently significantly limited in her activities of daily living.  She is no longer involving any of her leisure activities such as playing tennis.  Can ride her bike with minor difficulties.  She reports that her symptoms are currently worse when she is laying in down, improved by placing a pillow under her knees and allowing some flexion.  She is also experiencing significant pain in her feet, currently being treated by another orthopedic surgeon.  Denies low back pain.  She denies any fevers, chills, swelling, or redness around her knees.    HOSPITAL COURSE:    Surgery was uncomplicated. Postoperatively the patient was admitted to the orthopedic surgery service.  Medicine was consulted to help with management of medical comorbidities.  Intravenous antibiotics were provided for 24 hours  after the surgery.  Patient received routine nursing cares on the floor.  A clear liquid diet was started and subsequently advance to regular, which the patient tolerated without issue.  Stool softeners were administered while taking pain medications to prevent constipation.  The  patient was able to void independently.  Post operative xrays were obtained. Physical and occupational therapy were initiated for safety training, ADLs, mobility, and ROM exercises. After demonstrating the ability to tolerate a diet, pain control on oral pain medications, and evaluation by physical and occupational therapy, the patient was deemed medically safe for discharge to her home.    FOLLOW UP:      Future Appointments   Date Time Provider Department Center   9/11/2019  8:30 AM Katrin Ortiz PT URPT [place]   9/11/2019 10:00 AM Maria Elena Bucio OT [facility]OT [place]   9/11/2019  1:00 PM Carlene Cheney PT URPT [facility] ORTHOPEDICS - Located 4th Floor (4D ) in the Clinics and Surgery Center at [address] [place] [address].     Department Address: [address] [place] [address] 4th Floor [facility] [address]     [facility] Orthopedic Scheduling contact number: [phone]    Call if you haven't heard regarding these appointments within 7 days of discharge.      PHYSICAL EXAMINATION:  BP (!) 140/85 (BP Location: Left arm)   Pulse 72   Temp 96.9  F (36.1  C) (Oral)   Resp 18   SpO2 96%   No intake/output data recorded.  Gen: NAD. Resting comfortably in bed  Resp: Breathing comfortably  Drain:   No drain     Musculoskeletal:   LLE: Dressings are clean, dry, and intact.  There is no evidence of erythema or fluctuance surrounding the surgical field.  The patient can fire TA, GSC, FHL, and EHL.  Sensation is intact over the superficial peroneal, deep peroneal, sural, saphenous, and tibial nerve distributions.  The extremities warm and well-perfused.           PLANNED DISCHARGE ORDERS:          Discharge  Procedure Orders   Reason for your hospital stay   Order Comments: Knee replacement surgery     Adult [facility] Follow-up and recommended labs and tests   Order Comments: 2 weeks with Dr Khan team for a wound check.    Appointments on [place] and/or [facility] (with [facility] or [facility] provider or service). Call [phone] if you haven't heard regarding these appointments within 7 days of discharge.     Activity   Order Comments: Your activity upon discharge: activity as tolerated     Order Specific Question Answer Comments   Is discharge order? Yes      Discharge Instructions   Order Comments: TOTAL KNEE ARTHROPLASTY POST OPERATIVE DISCHARGE INSTRUCTIONS    FOLLOW UP APPOINTMENT  You are scheduled for a post operative wound check with Dr. Bryce piper approximately two weeks after surgery. At approximately six weeks after surgery, you will see Dr. Herron clinic. During this visit, repeat X rays of your operative knee will be performed.      Your follow up appointments will be at the location that you regularly see Dr. Wu:    [facility]  [address] [place] [address] [phone]    [facility]  [address] [place] [address] [phone]    Physical therapy:   A prescription for physical therapy will be provided at the time of discharge.       ACTIVITY  Weight bearing status:   You may bear weight on your operative extremity as tolerated, using assistive devices (walker, cane) as needed. As you begin to feel more comfortable ambulating, you may gradually transition from a walker to a cane. Eventually, you should wean from all assistive devices. Although we would like you to discontinue use of assistive devices as soon as possible, do not transition until you have worked with your physical therapist to achieve safe balance and comfort.     Range of motion:  Continue to work on knee range of motion to prevent  "stiffness. When you are sitting, \"bridge the leg\" and keep the leg fully straight, with a bump under the heel to ensure that you can fully straighten the knee. Also, work on getting your leg fully straight while walking - landing on your heel and progressing over the knee normally to prevent a flexion contracture.      Exercises:   Perform the following exercises at least three times per day for the first four weeks after surgery to prevent complications, such as blood clots in your legs:  1) Point and flex your feet  2) Move your ankle around in big circles  3) Wiggle your toes   Also, perform thigh muscle tightening exercises for 10 to 15 minutes at least three times per day for the first four weeks after surgery.    Athletic Activities:  Activities such as swimming, bicycling, jogging, running, and stop-and-go sports should be avoided until permitted by your provider.    Driving:  Driving is not permitted until directed by your provider. Under no circumstance are you permitted to drive while using narcotic pain medications.    Return to Work:  You may return to work when directed by your provider.       COMFORT AND PAIN MANAGEMENT  Elevation:  Some swelling in the leg is normal after surgery.  This type of swelling is usually gravity dependent and is improved with elevation. During times of inactivity throughout the first two weeks after surgery, make an effort to decrease swelling by elevating your operative extremity. This is most effectively done by lying down and placing several pillows lengthwise under your thigh and calf to raise your Wu above the level of your nose. To ensure that your knee remains in full extension, do not place pillows directly under your knee. If you begin to experience worsening swelling or calf pain, please contact Dr. Wu 's office. We may recommend presenting to the Emergency Department to obtain an ultrasound of the leg if there is concern for a DVT.        Icing:  An ice pack " will be provided to control swelling and discomfort after surgery. Place a thin towel on your skin and apply the ice pack overtop. You may apply ice for 20 minutes as often as two times per hour.    Pain Medications:  You will be discharged with acetaminophen (Tylenol) and a narcotic medication for pain management after surgery. Acetaminophen is most effective when it is taken per the schedule outlined by your provider (every four, six, or eight hours as prescribed). You may safely use acetaminophen as prescribed for the first four weeks after surgery provided you do not exceed the maximum daily dose prescribed by your provider (usually 3000 mg - 4000 mg). The narcotic pain medication should only be taken on an as-needed basis when necessary and should be reserved for severe pain that is not controlled with scheduled acetaminophen. In the first three days following surgery, your symptoms may warrant use of the narcotic pain medication every three, four, or six hours as prescribed. After three days, focus your efforts on decreasing (tapering) use of narcotic medications.   The most successful tapering strategy is to first, decrease the dose (number of tablets) and second, decrease the interval (time between doses). For example, if you begin taking two tablets every four hours after surgery, start your taper by decreasing one of these doses to one tablet. Every one to two days, decrease another dose to one tablet until you are eventually taking one tablet every four hours. Once this is achieved, focus on increasing the number of hours between doses, moving from one tablet every four hours to one tablet every six hours. As tolerated, continue to increase the interval to eight and twelve hours. Eventually, taper to one dose every evening and discontinue when no longer needed.      ANTICOAGULATION  Take blood thinners, this is given to help minimize your risk of blood clot.      WOUND CARE AND SHOWERING  Wound care:  You  have a clean Aquacel dressing on your surgical wound. This dressing should stay in place for seven days to allow the incision to heal. If the Aquacel dressing becomes excessively wet or saturated before removal on day seven, please contact Dr. Wu'soffice. After seven days, remove the dressing and leave the wound open to air (see showering instructions below). If there is any drainage from the incision after removal of the Aquacel, dress the wound with sterile 4x4 gauze, using tape over top to secure the dressing in place over the incision. Please contact Dr. Wu's office if you notice the following after removal of the Aquacel dressin) significant cloudy, bloody, or malodorous drainage from the incision, 2) excessive warmth and redness around the incision.    Showering:  You may shower 48 hours after surgery provided the Aquacel dressing is in place. Although you are strictly prohibited from soaking or submerging the surgical wound underwater, you may shower in the usual fashion, allowing water and soap to run over the Aquacel. Once the Aquacel is removed on the seventh day after surgery, you may continue to shower; however, the incision should be covered with saran wrap (or any other non-permeable covering) to allow the incision to remain dry and to prevent you from scrubbing/rubbing the incision. The Dermabond (surgical adhesive that is directly on the incision areas) should be left on until it falls off or is removed at your first office visit. After your incision is examined at your first office visit, you will likely be allowed to return to showering without covering the incision.     Tub Bathing:  Tub bathing, swimming, or any other activities that cause your incision to be submerged should be avoided until allowed by your provider. Typically, patients are allowed to return to these activities six weeks after surgery.      CONTACTING YOUR PHYSICIAN:  You may experience symptoms that require follow-up  before your scheduled two week appointment. Please contact Dr. Wu's office if you experience:  1) Pain in your knee that persists or worsens in the first few days after surgery  2) Excessive redness or drainage of cloudy or bloody material from the wounds (Clear red tinted fluid and some mild drainage should be expected) or drainage of any kind five days after surgery  3) A temperature elevation greater than 101.5    4) Pain, swelling or redness in your calf  5) Numbness or weakness in your leg or foot      Regular business hours (Monday - Friday, 8am - 5pm):  [facility] [place]: [phone]  [facility]: [phone]    After hours and weekends:  [facility] on call Orthopedic resident: [phone]     Full Code     Order Specific Question Answer Comments   Code status determined by: Discussion with patient/legal decision maker      Diet   Order Comments: Regular diet     Order Specific Question Answer Comments   Is discharge order? Yes            Layo Olivarez MD  Orthopaedic Surgery, PGY-4

## 2019-09-11 NOTE — PROGRESS NOTES
Initial PT Evaluation     09/11/19 0800   Quick Adds   Type of Visit Initial PT Evaluation   Living Environment   Lives With spouse   Living Arrangements house   Home Accessibility stairs to enter home;stairs within home   Number of Stairs, Main Entrance 1   Stair Railings, Main Entrance none   Number of Stairs, Within Home, Primary 10   Stair Railings, Within Home, Primary railings safe and in good condition   Transportation Anticipated family or friend will provide;car, drives self   Living Environment Comment PT: Patient lives with  who will be present to help after surgery   Self-Care   Usual Activity Tolerance good   Regular Exercise No   Equipment Currently Used at Home crutches;raised toilet;shower chair;walker, rolling;other (see comments)  (Grabber)   Activity/Exercise/Self-Care Comment PT: Patient has a lot of arthritis in her feet, combined with knee pain makes exercise challenging   Functional Level Prior   Ambulation 0-->independent   Transferring 0-->independent   Toileting 0-->independent   Bathing 0-->independent   Communication 0-->understands/communicates without difficulty   Swallowing 0-->swallows foods/liquids without difficulty   Cognition 0 - no cognition issues reported   Fall history within last six months no   Which of the above functional risks had a recent onset or change? ambulation;transferring   Prior Functional Level Comment PT: Patient completely IND with all mobility and ADLs.   General Information   Onset of Illness/Injury or Date of Surgery - Date 09/10/19   Referring Physician Layo Dejesus MD   Pertinent History of Current Problem (include personal factors and/or comorbidities that impact the POC) s/ p L TKA revision on 9/10.    Weight-Bearing Status - LLE weight-bearing as tolerated   Heart Disease Risk Factors Overweight   General Info Comments Activity: Up with assist, up to chair x3   Cognitive Status Examination   Orientation orientation to  person, place and time   Level of Consciousness alert   Follows Commands and Answers Questions 100% of the time;able to follow multistep instructions   Personal Safety and Judgment intact   Memory intact   Pain Assessment   Patient Currently in Pain No   Integumentary/Edema   Integumentary/Edema Comments PT: Normal post-op swelling   Range of Motion (ROM)   ROM Comment PT: Left Knee AROM 0-6-90 degrees   Strength   Strength Comments PT: Not formally tested, at least 3+/5 per observed mobiltiy. Able to do SLRx10 with LLE   Bed Mobility   Bed Mobility Comments PT: IND supine<>sit from flat bed.   Transfer Skills   Transfer Comments PT: SBA for sit<>stand , progress to [place] with crutches   Gait   Gait Comments PT: Patient ambulated x>800' with axillary crutches and SBA progress to [place].   Balance   Balance Comments PT: STanding balance with crutches is Elodia, good standing balance.   Sensory Examination   Sensory Perception Comments PT: Mild numbness still present around knee joint.   Modality Interventions   Planned Modality Interventions Cryotherapy   Planned Modality Interventions Comments PT: Ice PRN for pain   Clinical Impression   Criteria for Skilled Therapeutic Intervention yes, treatment indicated   PT Diagnosis Impaired functional mobility   Influenced by the following impairments Pain, decreased strength, decreased ROM   Functional limitations due to impairments Gait, tranfers, stairs   Clinical Presentation Stable/Uncomplicated   Clinical Presentation Rationale Doing very well POD #1, Patient was a PTA, clinical judgment   Clinical Decision Making (Complexity) Low complexity   Therapy Frequency 2x/day   Predicted Duration of Therapy Intervention (days/wks) 1-2 days   Anticipated Discharge Disposition Home with Outpatient Therapy;Home with Assist   Risk & Benefits of therapy have been explained Yes   Patient, Family & other staff in agreement with plan of care Yes   Clinical Impression Comments PT evaluation  "complete, 1 time treatment provided.   [facility] TM \"6 Clicks\"   2016, Trustees of [facility], under license to [employer].  All rights reserved.   6 Clicks Short Forms Basic Mobility Inpatient Short Form   [facility] [email or web address]  \"6 Clicks\" V.2 Basic Mobility Inpatient Short Form   1. Turning from your back to your side while in a flat bed without using bedrails? 4 - None   2. Moving from lying on your back to sitting on the side of a flat bed without using bedrails? 4 - None   3. Moving to and from a bed to a chair (including a wheelchair)? 4 - None   4. Standing up from a chair using your arms (e.g., wheelchair, or bedside chair)? 4 - None   5. To walk in hospital room? 4 - None   6. Climbing 3-5 steps with a railing? 4 - None   Basic Mobility Raw Score (Score out of 24.Lower scores equate to lower levels of function) 24   Total Evaluation Time   Total Evaluation Time (Minutes) 8       Katrin Ortzi PT, DTP  [employer]  [email or web address]  Pager: [phone]  "

## 2019-09-11 NOTE — PLAN OF CARE
Discharge Planner PT   Patient plan for discharge: Home with assist and OP PT  Current status: PT evaluation complete, treatment initiated. Patient has AROM 0-6-90. Patient IND with flat bed mobility. Patient SBA for sit<>stand, progress to IND with axillary crutches. Patient ambulated x>800' with axillary crutches SBA-Elodia, showing good safety and stability. Patient negotiated 2x3 stairs with rail on R and crutches on L, SBA-Elodia. Overall patient doing very well, tolerated exercises and at this time has no further PT needs. PT will complete orders.  Barriers to return to prior living situation: Medical, no mobility barriers at this time.  Recommendations for discharge: Home with assist PRN and OP PT  Rationale for recommendations: OP PT to progress strength, ROM, and flexibility    Physical Therapy Discharge Summary    Reason for therapy discharge:    All goals and outcomes met, no further needs identified.    Progress towards therapy goal(s). See goals on Care Plan in [facility] electronic health record for goal details.  Goals met    Therapy recommendation(s):    Continued therapy is recommended.  Rationale/Recommendations:  OP PT for strength, ROM and flexibility.           Entered by: Katrin Ortiz 09/11/2019 9:20 AM

## 2019-09-11 NOTE — PROGRESS NOTES
EMR reviewed.   Seen briefly.   No new medical concern  Rest per IM consult note.     Edmundo Hatch MD   Hospitalist (Internal Medicine)  Pager: [phone].

## 2019-09-12 ENCOUNTER — DOCUMENTATION ONLY (OUTPATIENT)
Dept: OTHER | Facility: CLINIC | Age: 59
End: 2019-09-12

## 2019-09-12 LAB — COPATH REPORT: NORMAL

## 2019-09-12 NOTE — PROGRESS NOTES
Cultures checked on 9/12 @ 7:25am, no growth at this time.    Layo Olivarez MD  Orthopaedic Surgery, PGY-4  Pager: [phone]

## 2019-09-13 LAB
BLD PROD TYP BPU: NORMAL
BLD PROD TYP BPU: NORMAL
BLD UNIT ID BPU: 0
BLD UNIT ID BPU: 0
BLOOD PRODUCT CODE: NORMAL
BLOOD PRODUCT CODE: NORMAL
BPU ID: NORMAL
BPU ID: NORMAL
TRANSFUSION STATUS PATIENT QL: NORMAL

## 2019-09-14 ENCOUNTER — DOCUMENTATION ONLY (OUTPATIENT)
Dept: OTHER | Facility: CLINIC | Age: 59
End: 2019-09-14

## 2019-09-14 NOTE — PROGRESS NOTES
Cultures continue to be negative.    9/14/2019 @ 3:39am.    Layo Olivarez MD  Orthopaedic Surgery, PGY-4  Pager: [phone]

## 2019-09-15 LAB
BACTERIA SPEC CULT: NO GROWTH
Lab: NORMAL
SPECIMEN SOURCE: NORMAL

## 2019-09-16 ENCOUNTER — THERAPY VISIT (OUTPATIENT)
Dept: PHYSICAL THERAPY | Facility: CLINIC | Age: 59
End: 2019-09-16
Attending: INTERNAL MEDICINE
Payer: COMMERCIAL

## 2019-09-16 DIAGNOSIS — M25.562 LEFT KNEE PAIN: Primary | ICD-10-CM

## 2019-09-16 DIAGNOSIS — Z98.890 STATUS POST KNEE SURGERY: ICD-10-CM

## 2019-09-16 DIAGNOSIS — R60.9 EDEMA: ICD-10-CM

## 2019-09-16 PROCEDURE — 97016 VASOPNEUMATIC DEVICE THERAPY: CPT | Mod: GP | Performed by: PHYSICAL THERAPIST

## 2019-09-16 PROCEDURE — 97162 PT EVAL MOD COMPLEX 30 MIN: CPT | Mod: GP | Performed by: PHYSICAL THERAPIST

## 2019-09-16 PROCEDURE — 97110 THERAPEUTIC EXERCISES: CPT | Mod: GP | Performed by: PHYSICAL THERAPIST

## 2019-09-16 ASSESSMENT — ACTIVITIES OF DAILY LIVING (ADL)
HOW_WOULD_YOU_RATE_THE_CURRENT_FUNCTION_OF_YOUR_KNEE_DURING_YOUR_USUAL_DAILY_ACTIVITIES_ON_A_SCALE_FROM_0_TO_100_WITH_100_BEING_YOUR_LEVEL_OF_KNEE_FUNCTION_PRIOR_TO_YOUR_INJURY_AND_0_BEING_THE_INABILITY_TO_PERFORM_ANY_OF_YOUR_USUAL_DAILY_ACTIVITIES?: 10
KNEE_ACTIVITY_OF_DAILY_LIVING_SUM: 24
STIFFNESS: THE SYMPTOM AFFECTS MY ACTIVITY SEVERELY
SIT WITH YOUR KNEE BENT: ACTIVITY IS VERY DIFFICULT
RISE FROM A CHAIR: ACTIVITY IS FAIRLY DIFFICULT
WEAKNESS: THE SYMPTOM AFFECTS MY ACTIVITY SEVERELY
AS_A_RESULT_OF_YOUR_KNEE_INJURY,_HOW_WOULD_YOU_RATE_YOUR_CURRENT_LEVEL_OF_DAILY_ACTIVITY?: SEVERELY ABNORMAL
KNEEL ON THE FRONT OF YOUR KNEE: I AM UNABLE TO DO THE ACTIVITY
RAW_SCORE: 24
SQUAT: I AM UNABLE TO DO THE ACTIVITY
GO DOWN STAIRS: ACTIVITY IS MINIMALLY DIFFICULT
HOW_WOULD_YOU_RATE_THE_OVERALL_FUNCTION_OF_YOUR_KNEE_DURING_YOUR_USUAL_DAILY_ACTIVITIES?: SEVERELY ABNORMAL
GO UP STAIRS: ACTIVITY IS MINIMALLY DIFFICULT
PAIN: THE SYMPTOM AFFECTS MY ACTIVITY SEVERELY
SWELLING: THE SYMPTOM AFFECTS MY ACTIVITY MODERATELY
GIVING WAY, BUCKLING OR SHIFTING OF KNEE: THE SYMPTOM AFFECTS MY ACTIVITY SLIGHTLY
KNEE_ACTIVITY_OF_DAILY_LIVING_SCORE: 34.29
LIMPING: THE SYMPTOM AFFECTS MY ACTIVITY SEVERELY
WALK: ACTIVITY IS FAIRLY DIFFICULT
STAND: ACTIVITY IS FAIRLY DIFFICULT

## 2019-09-16 NOTE — PROGRESS NOTES
[facility] Initial Evaluation  Subjective:    Type of problem:  Left knee   Occurance: loosening of left knee patellar component. This is a new condition   Problem details: S/p Left Knee TKA revision due to patellar component loosening on 9/10/19 by Dr. Wu..   Patient reports pain:  In the joint and anterior. Radiates to: NA. Associated symptoms:  Loss of strength and loss of motion/stiffness. Symptoms are exacerbated by bending/squatting, ascending stairs, descending stairs, standing, walking and weight bearing and relieved by ice and rest.                      Objective:  Standing Alignment:              Knee:  Normal      Gait:    Gait Type:  Antalgic   Weight Bearing Status:  WBAT   Assistive Devices:  Crutches                                                        Knee Evaluation:  ROM:    AROM    Hyperextension: Left:  4    Right:    Flexion: Left: 90   Right:  PROM    Hyperextension: Left: 4   Right:     Flexion: Left: 90   Right:       Strength:     Extension:  Left: 4/5   Pain:      Right: 5/5   Pain:    Quad Set Left: Good    Pain:   Quad Set Right: WNL    Pain:  Ligament Testing:  Not Assessed                Special Tests: Not Assessed      Palpation:  Not Assessed      Edema:  Edema of the knee: positive for post-surgical edema.    Mobility Testing:      Patellofemoral Medial:  Left: normal      Patellofemoral Lateral:  Left: normal      Patellofemoral Superior:  Left: normal      Patellofemoral Inferior:  Left: normal      Functional Testing:  not assessed                  General     ROS    Assessment/Plan:    Patient is a 58 year old female with left side knee complaints.    Patient has the following significant findings with corresponding treatment plan.                Diagnosis 1:  S/p Left TKA Revision Pain -  hot/cold therapy, self management, education and home program  Decreased ROM/flexibility - manual therapy and therapeutic exercise  Decreased strength - therapeutic  exercise and therapeutic activities  Edema - vasopneumatics  Impaired gait - gait training  Impaired muscle performance - neuro re-education  Decreased function - therapeutic activities    Therapy Evaluation Codes:   1) History comprised of:   Personal factors that impact the plan of care:      Age.    Comorbidity factors that impact the plan of care are:      Asthma, Depression, High blood pressure, Menopausal and Osteoarthritis.     Medications impacting care: Anti-depressant and High blood pressure.  2) Examination of Body Systems comprised of:   Body structures and functions that impact the plan of care:      Knee.   Activity limitations that impact the plan of care are:      Bathing, Bending, Cooking, Driving, Dressing, Lifting, Sitting, Squatting/kneeling, Stairs, Standing, Walking and Sleeping.  3) Clinical presentation characteristics are:   Evolving/Changing.  4) Decision-Making    Moderate complexity using standardized patient assessment instrument and/or measureable assessment of functional outcome.  Cumulative Therapy Evaluation is: Moderate complexity.    Previous and current functional limitations:  (See Goal Flow Sheet for this information)    Short term and Long term goals: (See Goal Flow Sheet for this information)     Communication ability:  Patient appears to be able to clearly communicate and understand verbal and written communication and follow directions correctly.  Treatment Explanation - The following has been discussed with the patient:   RX ordered/plan of care  Anticipated outcomes  Possible risks and side effects  This patient would benefit from PT intervention to resume normal activities.   Rehab potential is good.    Frequency:  2 X week, once daily  Duration:  for 6 weeks tapering to 1 x week for 6 weeks  Discharge Plan:  Achieve all LTG.  Independent in home treatment program.  Reach maximal therapeutic benefit.    Please refer to the daily flowsheet for treatment today, total  treatment time and time spent performing 1:1 timed codes.

## 2019-09-17 LAB
BACTERIA SPEC CULT: NORMAL
Lab: NORMAL
SPECIMEN SOURCE: NORMAL

## 2019-09-18 PROBLEM — R60.9 EDEMA: Status: ACTIVE | Noted: 2019-09-18

## 2019-09-18 NOTE — PROGRESS NOTES
[facility] Initial Evaluation  Subjective:       Pertinent medical history includes:  Asthma, high blood pressure, depression, menopausal and osteoarthritis.      Current medications:  Anti-depressants and high blood pressure medication.   Primary job tasks include:  Computer work.           Occupation: Part time service desk               Knee Activity of Daily Living Score: 34.29            Objective:  System    Physical Exam    General   ROS    Assessment/Plan:

## 2019-09-23 ENCOUNTER — OFFICE VISIT (OUTPATIENT)
Dept: ORTHOPEDICS | Facility: CLINIC | Age: 59
End: 2019-09-23
Payer: COMMERCIAL

## 2019-09-23 ENCOUNTER — ANCILLARY PROCEDURE (OUTPATIENT)
Dept: GENERAL RADIOLOGY | Facility: CLINIC | Age: 59
End: 2019-09-23
Attending: ORTHOPAEDIC SURGERY
Payer: COMMERCIAL

## 2019-09-23 DIAGNOSIS — Z96.652 STATUS POST REVISION OF TOTAL KNEE REPLACEMENT, LEFT: ICD-10-CM

## 2019-09-23 DIAGNOSIS — Z96.652 STATUS POST TOTAL LEFT KNEE REPLACEMENT: Primary | ICD-10-CM

## 2019-09-23 PROBLEM — M47.812 SPONDYLOSIS OF CERVICAL REGION WITHOUT MYELOPATHY OR RADICULOPATHY: Status: ACTIVE | Noted: 2019-08-08

## 2019-09-23 PROBLEM — M15.0 PRIMARY OSTEOARTHRITIS INVOLVING MULTIPLE JOINTS: Status: ACTIVE | Noted: 2019-08-08

## 2019-09-23 ASSESSMENT — ENCOUNTER SYMPTOMS
JOINT SWELLING: 1
NECK PAIN: 0
BACK PAIN: 0
ARTHRALGIAS: 1
MUSCLE CRAMPS: 0
MYALGIAS: 0
STIFFNESS: 0
MUSCLE WEAKNESS: 0

## 2019-09-23 ASSESSMENT — ACTIVITIES OF DAILY LIVING (ADL): FUNCTION,_DAILY_LIVING_SCORE: 83.82

## 2019-09-23 NOTE — PROGRESS NOTES
[facility]  Orthopaedic Surgery, Joint Replacement Consultation  by Iftikhar Wu M.D.    Ashly Paulino MRN# [id number]   Age: 58 year old YOB: 1960     Requesting physician: Referred Self  Ashly Montano     Background history:  DX:  1. DJD knees     TREATMENTS:  1. 10/31/16, Left TKA, Dr. KETAN Tran, [facility]  2. 11/12/2008, Right TKA, Dr. KETAN Tran, [facility]  3. 5/17/12, R rotator cuff repair (Armaan Hargrove)   4. 9/10/19, Revision left TKA patellar component (Bryce)    Postoperative knee replacement follow-up clinic visit    Ashly Paulino is doing well after last surgery listed above.    Preoperative knee pain is  Was resolved  Analgesic medication: None.    EXAM:  Incision healing, clean and dry.  Joint range of motion 130 degrees, pain free  Effusion: none  Periarticular swelling or leg edema: minimal  Peroneal and tibial nerve function: intact  Gait: Normal    Intraoperative cultures:  all negative     IMAGING:  Radiographs demonstrate the knee implant in satisfactory position without evidence of any complication.    IMPRESSION:  Excellent outcome to date after revision patellar component total knee arthroplasty left..     PLAN:    Continue range of motion exercises and stretching.    Quadriceps strengthening exercises.    May be weight bearing as tolerated.    Discontinue DVT prophylaxis meds (i.e., warfarin or ASA) if not required for any other reason.    Refill of:  (meds listed above): not needed.    Patient given instructions re: prophylactic antibiotic recommendations during dental work.    Next follow-up visit at 1 year after surgery or sooner as needed.       Iftikhar Wu M.D.  [employer] Professor  Oncology and Adult Reconstructive Surgery  Dept Orthopaedic Surgery, [facility]  [phone] office  [email or web address]    Mendez SALCEDO, a scribe, prepared the chart for today's encounter.       KOOS Knee Survey Assessment    Knee Outcome Survey ADL  Scale (Willy, JLASHAY; Matthew, L; Maria Guadalupe, RS; Jose Cruz, FH; Laurita, RUPERTO; 1998) 9/16/2019   Pain (ADLS1) 1   Stiffness (ADLS2) 1   Swelling (ADLS3) 2   Giving Way, Buckling or Shifting of Knee (ADLS4) 3   Weakness (ADLS5) 1   Limping (ADLS6) 1   Walk? (ADLS7) 2   Go up stairs? (ADLS8) 4   Go down stairs? (ADLS9) 4   Stand? (ADLS10) 2   Kneel on the front of your knee? (ADLS11) 0   Squat? (ADLS12) 0   Sit with your knee bent? (ADLS13) 1   How would you rate the current function of your knee during your usual daily activities on a scale from 0 to 100 with 100 being your level of knee function prior to your injury and 0 being the inability to perform any of your usual daily activities? 10   How would you rate the overall function of your knee during your usual daily activities?  (please check the one response that best describes you) 1   As a result of your knee injury, how would you rate your current level of daily activity? (please check the one response that best describes you) 1   Sum 24   Count 14   Raw Score 24   Knee Activity of Daily Living Score 34.29   Do you have swelling in your knee? -   Do you feel grinding, hear clicking or any other type of noise when your knee moves? -   Does your knee catch or hang up when moving? -   Can you straighten your knee fully? -   Can you bend your knee fully? -   How severe is your knee joint stiffness after first wakening in the morning? -   How severe is your knee stiffness after sitting, lying or resting LATER IN THE DAY? -   How often do you experience knee pain? -   Twisting/pivoting on your knee -   Straightening knee fully -   Bending knee fully -   Walking on flat surface -   Going up or down stairs -   At night while in bed -   Sitting or lying -   Standing upright -   Descending stairs -   Ascending stairs -   Rising from sitting -   Standing -   Bending to floor/ an object -   Walking on flat surface -   Getting in/out of car -   Going shopping -   Putting  on socks/stockings -   Rising from bed -   Taking off socks/stockings -   Lying in bed (turning over, maintaining knee position) -   Getting in/out of bath -   Sitting -   Getting on/off toilet -   Heavy domestic duties (moving heavy boxes, scrubbing floors, etc) -   Light domestic duties (cooking, dusting, etc) -   Squatting -   Running -   Jumping -   Twisting/pivoting on your injured knee -   Kneeling -   How often are you aware of your knee problem? -   Have you modified your life style to avoid potentially damaging activities to your knee? -   How much are you troubled with lack of confidence in your knee? -   In general, how much difficulty do you have with your knee? -   Pain Score -   Symptoms Score -   Function, Daily Living Score -   Sports and Rec Score -   Quality of Life Score -          Answers for HPI/ROS submitted by the patient on 9/23/2019   General Symptoms: No  Skin Symptoms: No  HENT Symptoms: No  EYE SYMPTOMS: No  HEART SYMPTOMS: No  LUNG SYMPTOMS: No  INTESTINAL SYMPTOMS: No  URINARY SYMPTOMS: No  GYNECOLOGIC SYMPTOMS: No  BREAST SYMPTOMS: No  SKELETAL SYMPTOMS: Yes  BLOOD SYMPTOMS: No  NERVOUS SYSTEM SYMPTOMS: No  MENTAL HEALTH SYMPTOMS: No  Back pain: No  Muscle aches: No  Neck pain: No  Swollen joints: Yes  Joint pain: Yes  Bone pain: No  Muscle cramps: No  Muscle weakness: No  Joint stiffness: No  Bone fracture: No

## 2019-09-23 NOTE — LETTER
9/23/2019       RE: Ashly Paulino  [address]  [place] [address]     Dear Colleague,    Thank you for referring your patient, Ashly Paulino, to the [facility] at [facility]. Please see a copy of my visit note below.    [facility]  Orthopaedic Surgery, Joint Replacement Consultation  by Iftikhar Wu M.D.    Ashly Paulino MRN# [id number]   Age: 58 year old YOB: 1960     Requesting physician: Referred Self  MontanoJacinta grewalcy     Background history:  DX:  1. DJD knees     TREATMENTS:  1. 10/31/16, Left TKA, Dr. KETAN Tran, [facility]  2. 11/12/2008, Right TKA, Dr. KETAN Tran, [facility]  3. 5/17/12, R rotator cuff repair (Armaan Hargrove)   4. 9/10/19, Revision left TKA patellar component (Bryce)    Postoperative knee replacement follow-up clinic visit    Ashly Paulino is doing well after last surgery listed above.    Preoperative knee pain is  Was resolved  Analgesic medication: None.    EXAM:  Incision healing, clean and dry.  Joint range of motion 130 degrees, pain free  Effusion: none  Periarticular swelling or leg edema: minimal  Peroneal and tibial nerve function: intact  Gait: Normal    Intraoperative cultures:  all negative     IMAGING:  Radiographs demonstrate the knee implant in satisfactory position without evidence of any complication.    IMPRESSION:  Excellent outcome to date after revision patellar component total knee arthroplasty left..     PLAN:    Continue range of motion exercises and stretching.    Quadriceps strengthening exercises.    May be weight bearing as tolerated.    Discontinue DVT prophylaxis meds (i.e., warfarin or ASA) if not required for any other reason.    Refill of:  (meds listed above): not needed.    Patient given instructions re: prophylactic antibiotic recommendations during dental work.    Next follow-up visit at 1 year after surgery or sooner as needed.        Iftikhar Wu M.D.  [employer] Family Professor  Oncology and Adult Reconstructive Surgery  Dept Orthopaedic Surgery, [facility]  [phone] office  [email or web address]    IMendez, a scribe, prepared the chart for today's encounter.       KOOS Knee Survey Assessment    Knee Outcome Survey ADL Scale (Willy, CARLITOS; SAMARA Castro; Maria Guadalupe, RS; Jose Cruz, FH; Laurita, RUPERTO; 1998) 9/16/2019   Pain (ADLS1) 1   Stiffness (ADLS2) 1   Swelling (ADLS3) 2   Giving Way, Buckling or Shifting of Knee (ADLS4) 3   Weakness (ADLS5) 1   Limping (ADLS6) 1   Walk? (ADLS7) 2   Go up stairs? (ADLS8) 4   Go down stairs? (ADLS9) 4   Stand? (ADLS10) 2   Kneel on the front of your knee? (ADLS11) 0   Squat? (ADLS12) 0   Sit with your knee bent? (ADLS13) 1   How would you rate the current function of your knee during your usual daily activities on a scale from 0 to 100 with 100 being your level of knee function prior to your injury and 0 being the inability to perform any of your usual daily activities? 10   How would you rate the overall function of your knee during your usual daily activities?  (please check the one response that best describes you) 1   As a result of your knee injury, how would you rate your current level of daily activity? (please check the one response that best describes you) 1   Sum 24   Count 14   Raw Score 24   Knee Activity of Daily Living Score 34.29   Do you have swelling in your knee? -   Do you feel grinding, hear clicking or any other type of noise when your knee moves? -   Does your knee catch or hang up when moving? -   Can you straighten your knee fully? -   Can you bend your knee fully? -   How severe is your knee joint stiffness after first wakening in the morning? -   How severe is your knee stiffness after sitting, lying or resting LATER IN THE DAY? -   How often do you experience knee pain? -   Twisting/pivoting on your knee -   Straightening knee fully -   Bending knee fully -   Walking on flat  surface -   Going up or down stairs -   At night while in bed -   Sitting or lying -   Standing upright -   Descending stairs -   Ascending stairs -   Rising from sitting -   Standing -   Bending to floor/ an object -   Walking on flat surface -   Getting in/out of car -   Going shopping -   Putting on socks/stockings -   Rising from bed -   Taking off socks/stockings -   Lying in bed (turning over, maintaining knee position) -   Getting in/out of bath -   Sitting -   Getting on/off toilet -   Heavy domestic duties (moving heavy boxes, scrubbing floors, etc) -   Light domestic duties (cooking, dusting, etc) -   Squatting -   Running -   Jumping -   Twisting/pivoting on your injured knee -   Kneeling -   How often are you aware of your knee problem? -   Have you modified your life style to avoid potentially damaging activities to your knee? -   How much are you troubled with lack of confidence in your knee? -   In general, how much difficulty do you have with your knee? -   Pain Score -   Symptoms Score -   Function, Daily Living Score -   Sports and Rec Score -   Quality of Life Score -      Again, thank you for allowing me to participate in the care of your patient.      Sincerely,    Iftikhar Wu MD

## 2019-09-23 NOTE — NURSING NOTE
Chief Complaint   Patient presents with     Surgical Followup     Wound Check S/p Revision Left Total Knee Replacement - Left DOS: 09/10/2019       58 year old  1960    [facility] [address]    Allergies   Allergen Reactions     Blood Transfusion Related (Informational Only) Other (See Comments)     Patient has a history of a clinically significant antibody against RBC antigens.  A delay in compatible RBCs may occur.     Diphenhydramine Swelling and Shortness Of Breath     throat     Iohexol Other (See Comments)     Serious severe headache, delayed response but brought her to ER  Other reaction(s): Headache  Serious severe headache, delayed response but brought her to ER  Serious severe headache, delayed response but brought her to ER       Lisinopril Cough     Morphine Other (See Comments)     Respiratory rate dropped seriously     Soap Hives     Nine years ago with surgery, does OK with hibiclens.     Amoxicillin Rash     Augmentin Rash       Current Outpatient Medications   Medication     Acetaminophen (TYLENOL PO)     albuterol (PROVENTIL HFA) 108 (90 Base) MCG/ACT inhaler     aspirin (ASA) 325 MG EC tablet     Celecoxib (CELEBREX PO)     CREAM BASE EX     DULoxetine (CYMBALTA) 20 MG capsule     fexofenadine-pseudoePHEDrine (ALLEGRA-D)  MG 12 hr tablet     fluticasone-salmeterol (ADVAIR) 250-50 MCG/DOSE inhaler     Ibuprofen (ADVIL PO)     losartan (COZAAR) 50 MG tablet     oxyCODONE (ROXICODONE) 5 MG tablet     senna-docusate (SENOKOT-S/PERICOLACE) 8.6-50 MG tablet     No current facility-administered medications for this visit.            Questionnaires:          KOOS Knee Survey Assessment    Knee Outcome Survey ADL Scale (Willy, CARLITOS; Matthew, SAMARA; Maria Guadalupe, KEL; Jose Cruz, DELBERT; Laurita, RUPERTO; 1998) 9/23/2019   Pain (ADLS1) -   Stiffness (ADLS2) -   Swelling (ADLS3) -   Giving Way, Buckling or Shifting of Knee (ADLS4) -   Weakness (ADLS5) -   Limping (ADLS6) -    Walk? (ADLS7) -   Go up stairs? (ADLS8) -   Go down stairs? (ADLS9) -   Stand? (ADLS10) -   Kneel on the front of your knee? (ADLS11) -   Squat? (ADLS12) -   Sit with your knee bent? (ADLS13) -   How would you rate the current function of your knee during your usual daily activities on a scale from 0 to 100 with 100 being your level of knee function prior to your injury and 0 being the inability to perform any of your usual daily activities? -   How would you rate the overall function of your knee during your usual daily activities?  (please check the one response that best describes you) -   As a result of your knee injury, how would you rate your current level of daily activity? (please check the one response that best describes you) -   Sum -   Count -   Raw Score -   Knee Activity of Daily Living Score -   Do you have swelling in your knee? Sometimes   Do you feel grinding, hear clicking or any other type of noise when your knee moves? Sometimes   Does your knee catch or hang up when moving? Never   Can you straighten your knee fully? Often   Can you bend your knee fully? Rarely   How severe is your knee joint stiffness after first wakening in the morning? None   How severe is your knee stiffness after sitting, lying or resting LATER IN THE DAY? Mild   How often do you experience knee pain? Weekly   Twisting/pivoting on your knee Mild   Straightening knee fully None   Bending knee fully Mild   Walking on flat surface None   Going up or down stairs None   At night while in bed Mild   Sitting or lying Mild   Standing upright None   Descending stairs Mild   Ascending stairs None   Rising from sitting None   Standing Mild   Bending to floor/ an object Mild   Walking on flat surface None   Getting in/out of car Mild   Going shopping Mild   Putting on socks/stockings Mild   Rising from bed None   Taking off socks/stockings Mild   Lying in bed (turning over, maintaining knee position) Mild   Getting in/out of  bath None   Sitting None   Getting on/off toilet None   Heavy domestic duties (moving heavy boxes, scrubbing floors, etc) Severe   Light domestic duties (cooking, dusting, etc) None   Squatting Severe   Running Extreme   Jumping Extreme   Twisting/pivoting on your injured knee Severe   Kneeling Extreme   How often are you aware of your knee problem? Daily   Have you modified your life style to avoid potentially damaging activities to your knee? Moderately   How much are you troubled with lack of confidence in your knee? Mildly   In general, how much difficulty do you have with your knee? Mild   Pain Score 83.33   Symptoms Score 67.85   Function, Daily Living Score 83.82   Sports and Rec Score 10   Quality of Life Score 56.25              Promis 10 Assessment    PROMIS 10 9/23/2019   In general, would you say your health is: Fair   In general, would you say your quality of life is: Good   In general, how would you rate your physical health? Fair   In general, how would you rate your mental health, including your mood and your ability to think? Good   In general, how would you rate your satisfaction with your social activities and relationships? Very good   In general, please rate how well you carry out your usual social activities and roles Good   To what extent are you able to carry out your everyday physical activities such as walking, climbing stairs, carrying groceries, or moving a chair? Moderately   How often have you been bothered by emotional problems such as feeling anxious, depressed or irritable? Rarely   How would you rate your fatigue on average? Moderate   How would you rate your pain on average?   0 = No Pain  to  10 = Worst Imaginable Pain 4   In general, would you say your health is: 2   In general, would you say your quality of life is: 3   In general, how would you rate your physical health? 2   In general, how would you rate your mental health, including your mood and your ability to think? 3   In  general, how would you rate your satisfaction with your social activities and relationships? 4   In general, please rate how well you carry out your usual social activities and roles. (This includes activities at home, at work and in your community, and responsibilities as a parent, child, spouse, employee, friend, etc.) 3   To what extent are you able to carry out your everyday physical activities such as walking, climbing stairs, carrying groceries, or moving a chair? 3   In the past 7 days, how often have you been bothered by emotional problems such as feeling anxious, depressed, or irritable? 2   In the past 7 days, how would you rate your fatigue on average? 3   In the past 7 days, how would you rate your pain on average, where 0 means no pain, and 10 means worst imaginable pain? 4   Global Mental Health Score 14   Global Physical Health Score 11   PROMIS TOTAL - SUBSCORES 25   Some recent data might be hidden

## 2019-09-24 ENCOUNTER — THERAPY VISIT (OUTPATIENT)
Dept: PHYSICAL THERAPY | Facility: CLINIC | Age: 59
End: 2019-09-24
Attending: INTERNAL MEDICINE
Payer: COMMERCIAL

## 2019-09-24 DIAGNOSIS — Z98.890 STATUS POST KNEE SURGERY: ICD-10-CM

## 2019-09-24 DIAGNOSIS — M25.562 ACUTE PAIN OF LEFT KNEE: ICD-10-CM

## 2019-09-24 DIAGNOSIS — R60.9 EDEMA, UNSPECIFIED TYPE: ICD-10-CM

## 2019-09-24 LAB
BACTERIA SPEC CULT: NORMAL
Lab: NORMAL
SPECIMEN SOURCE: NORMAL

## 2019-09-24 PROCEDURE — 97110 THERAPEUTIC EXERCISES: CPT | Mod: GP

## 2019-09-24 PROCEDURE — 97016 VASOPNEUMATIC DEVICE THERAPY: CPT | Mod: GP

## 2019-09-30 ENCOUNTER — HEALTH MAINTENANCE LETTER (OUTPATIENT)
Age: 59
End: 2019-09-30

## 2019-10-01 ENCOUNTER — THERAPY VISIT (OUTPATIENT)
Dept: PHYSICAL THERAPY | Facility: CLINIC | Age: 59
End: 2019-10-01
Attending: INTERNAL MEDICINE
Payer: COMMERCIAL

## 2019-10-01 DIAGNOSIS — M25.562 LEFT KNEE PAIN: ICD-10-CM

## 2019-10-01 DIAGNOSIS — R60.9 EDEMA: ICD-10-CM

## 2019-10-01 DIAGNOSIS — Z98.890 STATUS POST KNEE SURGERY: ICD-10-CM

## 2019-10-01 PROCEDURE — 97110 THERAPEUTIC EXERCISES: CPT | Mod: GP

## 2019-10-01 PROCEDURE — 97530 THERAPEUTIC ACTIVITIES: CPT | Mod: GP

## 2019-11-08 PROBLEM — M25.562 LEFT KNEE PAIN: Status: RESOLVED | Noted: 2018-09-18 | Resolved: 2019-11-08

## 2019-11-08 PROBLEM — Z98.890 STATUS POST KNEE SURGERY: Status: RESOLVED | Noted: 2019-09-10 | Resolved: 2019-11-08

## 2019-11-08 PROBLEM — R60.9 EDEMA: Status: RESOLVED | Noted: 2019-09-18 | Resolved: 2019-11-08

## 2019-11-08 NOTE — PROGRESS NOTES
Discharge Note    Progress reporting period is from initial evaluation date (please see noted date below) to Oct 1, 2019.  Linked Episodes   Type: Episode: Status: Noted: Resolved: Last update: Updated by:   PHYSICAL THERAPY s/p L TKA revision Active 9/16/2019  10/1/2019 11:56 AM Markus Mayers PT      Comments:       Ashly failed to follow up and current status is unknown.  Please see information below for last relevant information on current status.  Patient seen for 3 visits.    SUBJECTIVE  Subjective changes noted by patient:  feeling very good  .  Current pain level is  .     Previous pain level was  5/10.   Changes in function:  Yes (See Goal flowsheet attached for changes in current functional level)  Adverse reaction to treatment or activity: None    OBJECTIVE  Changes noted in objective findings: 0-0-120     ASSESSMENT/PLAN  Diagnosis: Left TKA revision of patellar compnents and poly change   Updated problem list and treatment plan:   Pain - HEP  STG/LTGs have been met or progress has been made towards goals:  Yes, please see goal flowsheet for most current information  Assessment of Progress: current status is unknown.    Last current status: Pt is progressing well   Self Management Plans:  HEP  I have re-evaluated this patient and find that the nature, scope, duration and intensity of the therapy is appropriate for the medical condition of the patient.  Ashly continues to require the following intervention to meet STG and LTG's:  HEP.    Recommendations:  Discharge with current home program.  Patient to follow up with MD as needed.    Please refer to the daily flowsheet for treatment today, total treatment time and time spent performing 1:1 timed codes.

## 2020-03-07 ENCOUNTER — MYC MEDICAL ADVICE (OUTPATIENT)
Dept: ORTHOPEDICS | Facility: CLINIC | Age: 60
End: 2020-03-07

## 2020-03-12 DIAGNOSIS — Z96.652 STATUS POST TOTAL LEFT KNEE REPLACEMENT: Primary | ICD-10-CM

## 2020-03-12 RX ORDER — CLINDAMYCIN HCL 300 MG
CAPSULE ORAL
Qty: 4 CAPSULE | Refills: 0 | Status: SHIPPED | OUTPATIENT
Start: 2020-03-12

## 2021-01-15 ENCOUNTER — HEALTH MAINTENANCE LETTER (OUTPATIENT)
Age: 61
End: 2021-01-15

## 2021-10-24 ENCOUNTER — HEALTH MAINTENANCE LETTER (OUTPATIENT)
Age: 61
End: 2021-10-24

## 2022-02-13 ENCOUNTER — HEALTH MAINTENANCE LETTER (OUTPATIENT)
Age: 62
End: 2022-02-13
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
## 2022-06-27 NOTE — PROGRESS NOTES
Care Coordinator Progress Note    Admission Date/Time:  9/10/2019  Attending MD:  No att. providers found    Data  Chart reviewed, discussed with interdisciplinary team.   Patient was admitted for: Status post knee surgery.    Concerns with insurance coverage for discharge needs: None.  Current Living Situation: Patient lives with spouse.  Support System: Involved  Services Involved: Outpatient Rehab  Transportation at Discharge: Family or friend will provide  Transportation to Medical Appointments:   - Name of caregiver: Bry,   Barriers to Discharge: NA    Coordination of Care and Referrals: Provided patient/family with options for Outpatient Rehab.        Assessment  Outpatient physical therapy referral sent to centralized scheduling. Patient to be contacted by centralized scheduling. No further discharge concerns at this time. RNCC available as needed.     Plan  Anticipated Discharge Date:  9/11/2019  Anticipated Discharge Plan:  Home with outpatient PT.    Jodie Leiva RN, BSN  Care Coordinator, 8A  Phone [phone]  Pager [phone]           9

## 2022-10-16 ENCOUNTER — HEALTH MAINTENANCE LETTER (OUTPATIENT)
Age: 62
End: 2022-10-16

## 2023-03-26 ENCOUNTER — HEALTH MAINTENANCE LETTER (OUTPATIENT)
Age: 63
End: 2023-03-26

## 2023-11-04 ENCOUNTER — HEALTH MAINTENANCE LETTER (OUTPATIENT)
Age: 63
End: 2023-11-04

## 2024-01-13 ENCOUNTER — HEALTH MAINTENANCE LETTER (OUTPATIENT)
Age: 64
End: 2024-01-13

## 2024-05-13 NOTE — NURSING NOTE
.   Chief Complaint   Patient presents with     Results     Pt. states that she is here today to Discuss XR and CT of Painful Left TKA.      RECHECK     Pt. states that she is also exp. Right Knee Pain and why she is exp. Bilat. Knee Pain/Issues.        58 year old  1960          [facility] [address] [facility] [employer] - [place], [place] - [address]    Allergies   Allergen Reactions     Diphenhydramine Swelling and Shortness Of Breath     throat     Iohexol Other (See Comments)     Serious severe headache, delayed response but brought her to ER  Other reaction(s): Headache  Serious severe headache, delayed response but brought her to ER  Serious severe headache, delayed response but brought her to ER       Lisinopril Cough     Morphine Other (See Comments)     Respiratory rate dropped seriously     Soap Hives     Nine years ago with surgery, does OK with hibiclens.     Augmentin Rash       Current Outpatient Medications   Medication     Acetaminophen (TYLENOL PO)     albuterol (PROVENTIL HFA) 108 (90 Base) MCG/ACT inhaler     Celecoxib (CELEBREX PO)     DULoxetine (CYMBALTA) 20 MG capsule     fexofenadine-pseudoePHEDrine (ALLEGRA-D)  MG 12 hr tablet     Ibuprofen (ADVIL PO)     losartan (COZAAR) 50 MG tablet     No current facility-administered medications for this visit.            Questionnaires:          KOOS Knee Survey Assessment    Knee Outcome Survey ADL Scale (CARLITOS Aguilera; Matthew, L; Maria Guadalupe, RS; Jose Cruz, FH; Laurita, CD; 1998) 8/19/2019   Pain (ADLS1) -   Stiffness (ADLS2) -   Swelling (ADLS3) -   Giving Way, Buckling or Shifting of Knee (ADLS4) -   Weakness (ADLS5) -   Limping (ADLS6) -   Walk? (ADLS7) -   Go up stairs? (ADLS8) -   Go down stairs? (ADLS9) -   Stand? (ADLS10) -   Kneel on the front of your knee? (ADLS11) -   Squat? (ADLS12) -   Sit with your knee bent? (ADLS13) -   How would you rate the current function of your knee during your usual daily activities on a scale from 0 to 100 with 100  being your level of knee function prior to your injury and 0 being the inability to perform any of your usual daily activities? -   How would you rate the overall function of your knee during your usual daily activities?  (please check the one response that best describes you) -   As a result of your knee injury, how would you rate your current level of daily activity? (please check the one response that best describes you) -   Sum -   Count -   Raw Score -   Knee Activity of Daily Living Score -   Do you have swelling in your knee? Sometimes   Do you feel grinding, hear clicking or any other type of noise when your knee moves? Always   Does your knee catch or hang up when moving? Never   Can you straighten your knee fully? Often   Can you bend your knee fully? Often   How severe is your knee joint stiffness after first wakening in the morning? None   How severe is your knee stiffness after sitting, lying or resting LATER IN THE DAY? None   How often do you experience knee pain? Daily   Twisting/pivoting on your knee Mild   Straightening knee fully None   Bending knee fully None   Walking on flat surface Mild   Going up or down stairs Moderate   At night while in bed Severe   Sitting or lying Mild   Standing upright Mild   Descending stairs Mild   Ascending stairs Mild   Rising from sitting Mild   Standing Mild   Bending to floor/ an object Mild   Walking on flat surface Mild   Getting in/out of car Mild   Going shopping Mild   Putting on socks/stockings None   Rising from bed Mild   Taking off socks/stockings Mild   Lying in bed (turning over, maintaining knee position) Severe   Getting in/out of bath Moderate   Sitting None   Getting on/off toilet None   Heavy domestic duties (moving heavy boxes, scrubbing floors, etc) Moderate   Light domestic duties (cooking, dusting, etc) Mild   Squatting Moderate   Running Extreme   Jumping Extreme   Twisting/pivoting on your injured knee Severe   Kneeling Moderate    How often are you aware of your knee problem? Daily   Have you modified your life style to avoid potentially damaging activities to your knee? Moderately   How much are you troubled with lack of confidence in your knee? Severely   In general, how much difficulty do you have with your knee? Severe   Pain Score 66.66   Symptoms Score 57.14   Function, Daily Living Score 73.52   Sports and Rec Score 25   Quality of Life Score 31.25              Promis 10 Assessment    PROMIS 10 8/19/2019   In general, would you say your health is: Good   In general, would you say your quality of life is: Good   In general, how would you rate your physical health? Fair   In general, how would you rate your mental health, including your mood and your ability to think? Very good   In general, how would you rate your satisfaction with your social activities and relationships? Very good   In general, please rate how well you carry out your usual social activities and roles Very good   To what extent are you able to carry out your everyday physical activities such as walking, climbing stairs, carrying groceries, or moving a chair? Mostly   How often have you been bothered by emotional problems such as feeling anxious, depressed or irritable? Sometimes   How would you rate your fatigue on average? Moderate   How would you rate your pain on average?   0 = No Pain  to  10 = Worst Imaginable Pain 5   In general, would you say your health is: 3   In general, would you say your quality of life is: 3   In general, how would you rate your physical health? 2   In general, how would you rate your mental health, including your mood and your ability to think? 4   In general, how would you rate your satisfaction with your social activities and relationships? 4   In general, please rate how well you carry out your usual social activities and roles. (This includes activities at home, at work and in your community, and responsibilities as a parent, child,  spouse, employee, friend, etc.) 4   To what extent are you able to carry out your everyday physical activities such as walking, climbing stairs, carrying groceries, or moving a chair? 4   In the past 7 days, how often have you been bothered by emotional problems such as feeling anxious, depressed, or irritable? 3   In the past 7 days, how would you rate your fatigue on average? 3   In the past 7 days, how would you rate your pain on average, where 0 means no pain, and 10 means worst imaginable pain? 5   Global Mental Health Score 14   Global Physical Health Score 12   PROMIS TOTAL - SUBSCORES 26   Some recent data might be hidden

## 2024-07-03 DIAGNOSIS — M25.562 LEFT KNEE PAIN, UNSPECIFIED CHRONICITY: Primary | ICD-10-CM

## 2024-07-03 ASSESSMENT — KOOS JR
TWISING OR PIVOTING ON KNEE: MILD
RISING FROM SITTING: MILD
GOING UP OR DOWN STAIRS: SEVERE
KOOS JR SCORING: 73.34

## 2024-07-03 NOTE — PROGRESS NOTES
[facility]  Orthopaedic Surgery, Joint Replacement Consultation  by Iftikhar Wu M.D.    Ashly Paulino MRN# [id number]    YOB: 1960     Requesting physician: No ref. provider found  Ashly Montano     Background history:  DX:  DJD knees     TREATMENTS:  10/31/16, Left TKA, Dr. KETAN Tran, [facility]  11/12/2008, Right TKA, Dr. KETAN Tran, [facility]  5/17/12, R rotator cuff repair (Armaan Hargrove)   9/10/19, Revision left TKA patellar component (Bryce)  04/2024, R thumb CMC arthroplasty (Esme Willoughby)           Assessment and Plan:   Assessment:  Left lateral sided joint pain after prior left total knee arthroplasty and revision patellar component.  This has been an ongoing issue for the patient but has become progressively more severe over time.  While there is evidence on physical examination of increased inflammation and warmth in the region of the tensor fascia angela and lateral joint line, I am not able to identify a mechanical, ligamentous, patellofemoral issue responsible for her symptoms.  Radiographically there is evidence of ossicle forming at the inferior aspect of her patella however I do not think it is responsible for her symptomatology as she has no tenderness in this area.    I advise expectant management the use of ice, orthotic management with either medial or lateral heel wedge and if this is not beneficial, then local steroid injection could be rendered.  If these unacceptable, then intraoperative arthroscopic examination of the knee joint is possible although I do not think would be likely to render a specific diagnosis.  I have not recommended any exploratory arthrotomy or open surgery of her total knee.  There is no evidence of infectious process at this time.         Plan:  See above      MD Jeana Reyes Family Professor  Oncology and Adult Reconstructive Surgery  Dept Orthopaedic Surgery, [facility]  [phone] office, [phone]  pager  [email or web address]       For additional information and frequently asked questions regarding joint replacements, scan the QR code image below on your phone camera or go to:  [email or web address]        This note was created using dictation software and may contain errors.  Please contact the creator for any clarifications that are needed.           History of Present Illness:   63 year old female  chief complaint    This patient has discomfort along the lateral side of her left knee arthroplasty.  She had difficulty with full extension of the knee joint and patellofemoral pain previously and 5 years ago I performed a revision of her patellar implant.  This was quite successful in alleviating the majority of her symptoms however the lateral sided knee pain has persisted and been low-grade until more recently when is progressively problematic for her.  She is able to independently ambulate and participate in some athletic activities but notices discomfort and would like further evaluation.    Current symptoms:  Problem Left knee pain. Was told that all her pain would not go away and only now has pain when bearing weight   Onset and duration: Continuous pain from last surgery. Has worsened over the last few months.    Awakens from sleep due to sx's:  No  Precipitating Injury:  No, has gotten new orthotics and they have slightly helped   Other joints or sites painful:  Yes  Prior treatments:  Non steroidal anti-inflammatory agents or aspirin: Yes  Reduced activity:  Yes  Physical therapy:  Yes  Chiropractic care:  No  Injections with either steroid or viscosupplementation agents:  Yes             Physical Exam:     EXAMINATION pertinent findings:   PSYCH: Pleasant, healthy-appearing, alert, oriented x3, cooperative. Normal mood and affect.  VITAL SIGNS: not currently breastfeeding..  Reviewed nursing intake notes.   There is no height or weight on file to calculate  BMI.  RESP: non labored breathing   ABD: benign, soft, non-tender, no acute peritoneal findings  SKIN: grossly normal   LYMPHATIC: grossly normal, no adenopathy, no extremity edema  NEURO: grossly normal , no motor deficits  VASCULAR: satisfactory perfusion of all extremities   MUSCULOSKELETAL:   Gait is normal.  Patient states she will limp on occasion but there is no evidence of this on physical examination today.    Slightly decreased internal rotation to the left hip joint as compared with the right hip.    Left knee fully extends and has further flexion to 120 degrees.  Incision is well-healed.  No effusion in the joint.  Collateral ligaments stable without laxity.  Lateral sided joint line discomfort and increased warmth focally as compared to the contralateral right knee joint.    Right knee fully extends as further flexion to 120 degrees.  Well-healed incision without evidence of effusion or collateral ligament laxity.  No increased warmth on the medial lateral joint line.           Data:   All laboratory data reviewed  All imaging studies reviewed by me    AP and lateral views of the radiographs are reviewed as well as patellofemoral views and do not reveal any evidence of implant loosening or malposition.  The inferior end of her patella shows evidence of formation of a spur and subsequent ossicle formation over the past 5 years.  No other findings identified.          DATA for DOCUMENTATION:         Past Medical History:     Patient Active Problem List   Diagnosis    Loosening of knee joint prosthesis  (H24)    Backache    Adjustment reaction    Allergic rhinitis    Depression    Esophageal reflux    Insomnia    Maxillary sinusitis, acute    CHAYITO (obstructive sleep apnea)    Primary osteoarthritis involving multiple joints    Spondylosis of cervical region without myelopathy or radiculopathy    Symptomatic menopausal or female climacteric states     Past Medical History:   Diagnosis Date    Adjustment  disorder     Allergic rhinitis     Backache     Blood transfusion reaction     itching -     Degenerative joint disease 2014    injections begin for left knee arthritis, TKA 10/31/16    Depression     GERD (gastroesophageal reflux disease)     Insomnia     Neck injuries     Arthritic changes in bilteral feet & cervical     Tinnitus     Tinnitus, unspecified laterality     Uncomplicated asthma        Also see scanned health assessment forms.       Past Surgical History:     Past Surgical History:   Procedure Laterality Date    ARTHROPLASTY KNEE Left 10/31/2016    Procedure: ARTHROPLASTY KNEE;  Surgeon: Gregory Tran MD;  Location: [facility] OR    ARTHROPLASTY REVISION KNEE Left 9/10/2019    Procedure: Revision Left Total Knee Replacement;  Surgeon: Iftikhar Wu MD;  Location: [facility] OR    C SHOULDER SURG PROC UNLISTED  16    Right Rotator Cuff repair    C STOMACH SURGERY PROCEDURE UNLISTED       SECTION       SECTION, IMMEDIATE HYSTERECTOMY, COMBINED      triplets    COLONOSCOPY      ENT SURGERY      septoplasty    esophogastroduodenoscopy      KNEE SURGERY  10/31/16    lateral knee discomfort begins     MYOMECTOMY UTERUS      OOPHORECTOMY Right     ORTHOPEDIC SURGERY      lt rotator cuff repair    partial lateral meniscus repair      rt    pr lateral retinacular release open      rt lateral meniscectomy and cyst removal              Social History:     Social History     Socioeconomic History    Marital status:      Spouse name: Not on file    Number of children: 3    Years of education: Not on file    Highest education level: Not on file   Occupational History    Occupation: tennis service desk   Tobacco Use    Smoking status: Never    Smokeless tobacco: Never   Substance and Sexual Activity    Alcohol use: Yes     Comment: 1-2 weekly    Drug use: Yes     Types: Marijuana     Comment: rare marijuana  smoking    Sexual activity: Not Currently   Other Topics Concern    Not on file    Social History Narrative    Not on file     Social Determinants of Health     Financial Resource Strain: Not on file   Food Insecurity: Not on file   Transportation Needs: Not on file   Physical Activity: Not on file   Stress: Not on file   Social Connections: Not on file   Interpersonal Safety: Not on file   Housing Stability: Not on file            Family History:       Family History   Problem Relation Age of Onset    Coronary Artery Disease Father              Coronary Artery Disease Brother              Hypertension Mother         98 years old/passed away at 99 years 2019    Hyperlipidemia Mother         98 years old/98 years old    Osteoporosis Mother         (2) knees, (1) hip/(2) knees, (1) hip    No Known Problems Sister     Osteoarthritis Sister             Medications:     Current Outpatient Medications   Medication Sig Dispense Refill    Acetaminophen (TYLENOL PO) Take 650-1,300 mg by mouth daily as needed       albuterol (PROVENTIL HFA) 108 (90 Base) MCG/ACT inhaler Inhale 1-2 puffs into the lungs as needed       Celecoxib (CELEBREX PO) Take 200 mg by mouth every morning       clindamycin (CLEOCIN) 300 MG capsule Take 2 capsules orally by mouth 1 hour prior to dental procedure 4 capsule 0    CREAM BASE EX Externally apply topically as needed (Active Cream)      DULoxetine (CYMBALTA) 20 MG capsule Take 20 mg by mouth every morning       fexofenadine-pseudoePHEDrine (ALLEGRA-D)  MG 12 hr tablet Take 1 tablet by mouth as needed       fluticasone-salmeterol (ADVAIR) 250-50 MCG/DOSE inhaler Inhale 1 puff into the lungs as needed      Ibuprofen (ADVIL PO) Take 400 mg by mouth daily as needed       losartan (COZAAR) 50 MG tablet Take 75 mg by mouth every morning       oxyCODONE (ROXICODONE) 5 MG tablet Take 1-2 tablets (5-10 mg) by mouth every 3 hours as needed 40 tablet 0    senna-docusate (SENOKOT-S/PERICOLACE) 8.6-50 MG tablet Take 2 tablets by mouth 2 times daily 30 tablet 0      No current facility-administered medications for this visit.              Review of Systems:   A comprehensive 10 point review of systems (constitutional, ENT, cardiac, peripheral vascular, lymphatic, respiratory, GI, , Musculoskeletal, skin, Neurological) was performed and found to be negative except as described in this note.       HOOS Hip Dysfunction & Osteoarthritis Outcome Questionnaire         No data to display                       [unfilled]    KOOS Knee Survey Assessment        9/23/2019    10:49 AM   Knee Outcome Survey ADL Scale (CARLITOS Aguilera; SAMARA Castro; Maria Guadalupe, RS; Jose Cruz, FH; Laurita, RUPERTO; 1998)   Do you have swelling in your knee? Sometimes   Do you feel grinding, hear clicking or any other type of noise when your knee moves? Sometimes   Does your knee catch or hang up when moving? Never   Can you straighten your knee fully? Often   Can you bend your knee fully? Rarely   How severe is your knee joint stiffness after first wakening in the morning? None   How severe is your knee stiffness after sitting, lying or resting LATER IN THE DAY? Mild   How often do you experience knee pain? Weekly   Twisting/pivoting on your knee Mild   Straightening knee fully None   Bending knee fully Mild   Walking on flat surface None   Going up or down stairs None   At night while in bed Mild   Sitting or lying Mild   Standing upright None   Descending stairs Mild   Ascending stairs None   Rising from sitting None   Standing Mild   Bending to floor/ an object Mild   Walking on flat surface None   Getting in/out of car Mild   Going shopping Mild   Putting on socks/stockings Mild   Rising from bed None   Taking off socks/stockings Mild   Lying in bed (turning over, maintaining knee position) Mild   Getting in/out of bath None   Sitting None   Getting on/off toilet None   Heavy domestic duties (moving heavy boxes, scrubbing floors, etc) Severe   Light domestic duties (cooking, dusting, etc) None   Squatting  Severe   Running Extreme   Jumping Extreme   Twisting/pivoting on your injured knee Severe   Kneeling Extreme   How often are you aware of your knee problem? Daily   Have you modified your life style to avoid potentially damaging activities to your knee? Moderately   How much are you troubled with lack of confidence in your knee? Mildly   In general, how much difficulty do you have with your knee? Mild   Pain Score 83.33   Symptoms Score 67.85   Function, Daily Living Score 83.82   Sports and Rec Score 10   Quality of Life Score 56.25              Promis 10 Assessment        9/23/2019    10:43 AM   PROMIS 10   In general, would you say your health is: Fair   In general, would you say your quality of life is: Good   In general, how would you rate your physical health? Fair   In general, how would you rate your mental health, including your mood and your ability to think? Good   In general, how would you rate your satisfaction with your social activities and relationships? Very good   In general, please rate how well you carry out your usual social activities and roles Good   To what extent are you able to carry out your everyday physical activities such as walking, climbing stairs, carrying groceries, or moving a chair? Moderately   In the past 7 days, how often have you been bothered by emotional problems such as feeling anxious, depressed, or irritable? Rarely   In the past 7 days, how would you rate your fatigue on average? Moderate   In the past 7 days, how would you rate your pain on average, where 0 means no pain, and 10 means worst imaginable pain? 4   In general, would you say your health is: 2   In general, would you say your quality of life is: 3   In general, how would you rate your physical health? 2   In general, how would you rate your mental health, including your mood and your ability to think? 3   In general, how would you rate your satisfaction with your social activities and relationships? 4   In  general, please rate how well you carry out your usual social activities and roles. (This includes activities at home, at work and in your community, and responsibilities as a parent, child, spouse, employee, friend, etc.) 3   To what extent are you able to carry out your everyday physical activities such as walking, climbing stairs, carrying groceries, or moving a chair? 3   In the past 7 days, how often have you been bothered by emotional problems such as feeling anxious, depressed, or irritable? 2   In the past 7 days, how would you rate your fatigue on average? 3   In the past 7 days, how would you rate your pain on average, where 0 means no pain, and 10 means worst imaginable pain? 4   Global Mental Health Score 14   Global Physical Health Score 11   PROMIS TOTAL - SUBSCORES 25              Ortho [place] Knee Questionnaire        11/4/2017     7:23 PM   [place] Knee Score (  [employer], 1998. All rights reserved) - Problems with knee during last 4 Weeks   1.  How would you describe the pain you usually have from your knee? Moderate   2.  Have you had any trouble with washing and drying yourself (all over) because of your knee? No trouble at all   3.  Have you had any trouble getting in and out of a car or using public transportation because of your knee? (whichever you would tend to use) Very little trouble   4.  For how long have you been able to walk before pain from you knee becomes severe? (with or without cane) No pain/more than 30 minutes   5.  After a meal (sitting at a table), how painful has it been for you to stand up from a chair because of your knee? Not at all painful   6.  Have you been limping when walking because of your knee? Rarely/never   7.  Could you kneel down and get up again afterwards? With moderate difficulty   8.  Have you been troubled by pain from your knee in bed at night? Every night   9.  How much has pain from your knee interfered with your usual work (including  "housework)? A little bit   10. Have you felt that your knee might suddenly \"give out\" or let you down? Sometimes, or just at first   11. Could you do the grocery shopping on your own? Yes easily   12. Could you walk down one flight of stairs? With moderate difficulty   [place] TOTAL SCORE 34                See intake form completed by patient    "

## 2024-07-03 NOTE — TELEPHONE ENCOUNTER
DIAGNOSIS: LEFT KNEE   APPOINTMENT DATE: 07/08/2024     NOTES STATUS DETAILS   OFFICE NOTE from referring provider SELF LAST SEEN:  09/23/2019 - Iftikhar Wu MD - [facility]   OPERATIVE REPORT Internal 09/10/2019 - Revision LT TKA  10/31/2016 - LT TKA

## 2024-07-08 ENCOUNTER — ANCILLARY PROCEDURE (OUTPATIENT)
Dept: GENERAL RADIOLOGY | Facility: CLINIC | Age: 64
End: 2024-07-08
Attending: ORTHOPAEDIC SURGERY
Payer: COMMERCIAL

## 2024-07-08 ENCOUNTER — PRE VISIT (OUTPATIENT)
Dept: ORTHOPEDICS | Facility: CLINIC | Age: 64
End: 2024-07-08

## 2024-07-08 ENCOUNTER — OFFICE VISIT (OUTPATIENT)
Dept: ORTHOPEDICS | Facility: CLINIC | Age: 64
End: 2024-07-08
Payer: COMMERCIAL

## 2024-07-08 VITALS — HEIGHT: 68 IN | WEIGHT: 228 LBS | BODY MASS INDEX: 34.56 KG/M2

## 2024-07-08 DIAGNOSIS — Z96.652 PAIN DUE TO TOTAL LEFT KNEE REPLACEMENT, SUBSEQUENT ENCOUNTER: Primary | ICD-10-CM

## 2024-07-08 DIAGNOSIS — M25.562 LEFT KNEE PAIN, UNSPECIFIED CHRONICITY: ICD-10-CM

## 2024-07-08 DIAGNOSIS — T84.84XD PAIN DUE TO TOTAL LEFT KNEE REPLACEMENT, SUBSEQUENT ENCOUNTER: Primary | ICD-10-CM

## 2024-07-08 PROCEDURE — 99204 OFFICE O/P NEW MOD 45 MIN: CPT | Performed by: ORTHOPAEDIC SURGERY

## 2024-07-08 PROCEDURE — 73562 X-RAY EXAM OF KNEE 3: CPT | Mod: LT | Performed by: RADIOLOGY

## 2024-07-08 RX ORDER — PANTOPRAZOLE SODIUM 40 MG/1
FOR SUSPENSION ORAL
COMMUNITY
Start: 2024-01-31

## 2024-07-08 RX ORDER — TRAZODONE HYDROCHLORIDE 50 MG/1
50 TABLET, FILM COATED ORAL
COMMUNITY
Start: 2024-03-07

## 2024-07-08 RX ORDER — METOPROLOL SUCCINATE 25 MG/1
25 TABLET, EXTENDED RELEASE ORAL DAILY
COMMUNITY

## 2024-07-08 RX ORDER — METFORMIN HCL 500 MG
TABLET, EXTENDED RELEASE 24 HR ORAL
COMMUNITY

## 2024-07-08 RX ORDER — ROSUVASTATIN CALCIUM 20 MG/1
20 TABLET, COATED ORAL AT BEDTIME
COMMUNITY

## 2024-07-08 NOTE — LETTER
7/8/2024      Ashly Paulino  [address]  [place] [address]      Dear Colleague,    Thank you for referring your patient, Ashly Paulino, to the [facility] [place]. Please see a copy of my visit note below.        [facility]  Orthopaedic Surgery, Joint Replacement Consultation  by Iftikhar Wu M.D.    Ashly Paulino MRN# [id number]    YOB: 1960     Requesting physician: No ref. provider found  Ashly Montano     Background history:  DX:  DJD knees     TREATMENTS:  10/31/16, Left TKA, Dr. KETAN Tran, [facility]  11/12/2008, Right TKA, Dr. KETAN Tran, [facility]  5/17/12, R rotator cuff repair (Armaan Hargrove)   9/10/19, Revision left TKA patellar component (Bryce)  04/2024, R thumb CMC arthroplasty (Esme Willoughby)           Assessment and Plan:   Assessment:  Left lateral sided joint pain after prior left total knee arthroplasty and revision patellar component.  This has been an ongoing issue for the patient but has become progressively more severe over time.  While there is evidence on physical examination of increased inflammation and warmth in the region of the tensor fascia angela and lateral joint line, I am not able to identify a mechanical, ligamentous, patellofemoral issue responsible for her symptoms.  Radiographically there is evidence of ossicle forming at the inferior aspect of her patella however I do not think it is responsible for her symptomatology as she has no tenderness in this area.    I advise expectant management the use of ice, orthotic management with either medial or lateral heel wedge and if this is not beneficial, then local steroid injection could be rendered.  If these unacceptable, then intraoperative arthroscopic examination of the knee joint is possible although I do not think would be likely to render a specific diagnosis.  I have not recommended any exploratory arthrotomy or open surgery of her total  knee.  There is no evidence of infectious process at this time.         Plan:  See above      MD Jeana Reyes Family Professor  Oncology and Adult Reconstructive Surgery  Dept Orthopaedic Surgery, [facility]  [phone] office, [phone] pager  [email or web address]       For additional information and frequently asked questions regarding joint replacements, scan the QR code image below on your phone camera or go to:  [email or web address]        This note was created using dictation software and may contain errors.  Please contact the creator for any clarifications that are needed.           History of Present Illness:   63 year old female  chief complaint    This patient has discomfort along the lateral side of her left knee arthroplasty.  She had difficulty with full extension of the knee joint and patellofemoral pain previously and 5 years ago I performed a revision of her patellar implant.  This was quite successful in alleviating the majority of her symptoms however the lateral sided knee pain has persisted and been low-grade until more recently when is progressively problematic for her.  She is able to independently ambulate and participate in some athletic activities but notices discomfort and would like further evaluation.    Current symptoms:  Problem Left knee pain. Was told that all her pain would not go away and only now has pain when bearing weight   Onset and duration: Continuous pain from last surgery. Has worsened over the last few months.    Awakens from sleep due to sx's:  No  Precipitating Injury:  No, has gotten new orthotics and they have slightly helped   Other joints or sites painful:  Yes  Prior treatments:  Non steroidal anti-inflammatory agents or aspirin: Yes  Reduced activity:  Yes  Physical therapy:  Yes  Chiropractic care:  No  Injections with either steroid or viscosupplementation agents:  Yes             Physical Exam:      EXAMINATION pertinent findings:   PSYCH: Pleasant, healthy-appearing, alert, oriented x3, cooperative. Normal mood and affect.  VITAL SIGNS: not currently breastfeeding..  Reviewed nursing intake notes.   There is no height or weight on file to calculate BMI.  RESP: non labored breathing   ABD: benign, soft, non-tender, no acute peritoneal findings  SKIN: grossly normal   LYMPHATIC: grossly normal, no adenopathy, no extremity edema  NEURO: grossly normal , no motor deficits  VASCULAR: satisfactory perfusion of all extremities   MUSCULOSKELETAL:   Gait is normal.  Patient states she will limp on occasion but there is no evidence of this on physical examination today.    Slightly decreased internal rotation to the left hip joint as compared with the right hip.    Left knee fully extends and has further flexion to 120 degrees.  Incision is well-healed.  No effusion in the joint.  Collateral ligaments stable without laxity.  Lateral sided joint line discomfort and increased warmth focally as compared to the contralateral right knee joint.    Right knee fully extends as further flexion to 120 degrees.  Well-healed incision without evidence of effusion or collateral ligament laxity.  No increased warmth on the medial lateral joint line.           Data:   All laboratory data reviewed  All imaging studies reviewed by me    AP and lateral views of the radiographs are reviewed as well as patellofemoral views and do not reveal any evidence of implant loosening or malposition.  The inferior end of her patella shows evidence of formation of a spur and subsequent ossicle formation over the past 5 years.  No other findings identified.          DATA for DOCUMENTATION:         Past Medical History:     Patient Active Problem List   Diagnosis    Loosening of knee joint prosthesis  (H24)    Backache    Adjustment reaction    Allergic rhinitis    Depression    Esophageal reflux    Insomnia    Maxillary sinusitis, acute    CHAYITO  (obstructive sleep apnea)    Primary osteoarthritis involving multiple joints    Spondylosis of cervical region without myelopathy or radiculopathy    Symptomatic menopausal or female climacteric states     Past Medical History:   Diagnosis Date    Adjustment disorder     Allergic rhinitis     Backache     Blood transfusion reaction     itching -     Degenerative joint disease     injections begin for left knee arthritis, TKA 10/31/16    Depression     GERD (gastroesophageal reflux disease)     Insomnia     Neck injuries     Arthritic changes in bilteral feet & cervical     Tinnitus     Tinnitus, unspecified laterality     Uncomplicated asthma        Also see scanned health assessment forms.       Past Surgical History:     Past Surgical History:   Procedure Laterality Date    ARTHROPLASTY KNEE Left 10/31/2016    Procedure: ARTHROPLASTY KNEE;  Surgeon: Gregory Tran MD;  Location: [facility] OR    ARTHROPLASTY REVISION KNEE Left 9/10/2019    Procedure: Revision Left Total Knee Replacement;  Surgeon: Iftikhar Wu MD;  Location: [facility] OR    C SHOULDER SURG PROC UNLISTED  16    Right Rotator Cuff repair    C STOMACH SURGERY PROCEDURE UNLISTED       SECTION       SECTION, IMMEDIATE HYSTERECTOMY, COMBINED      triplets    COLONOSCOPY      ENT SURGERY      septoplasty    esophogastroduodenoscopy      KNEE SURGERY  10/31/16    lateral knee discomfort begins     MYOMECTOMY UTERUS      OOPHORECTOMY Right     ORTHOPEDIC SURGERY      lt rotator cuff repair    partial lateral meniscus repair      rt    pr lateral retinacular release open      rt lateral meniscectomy and cyst removal              Social History:     Social History     Socioeconomic History    Marital status:      Spouse name: Not on file    Number of children: 3    Years of education: Not on file    Highest education level: Not on file   Occupational History    Occupation: tennis service desk   Tobacco Use    Smoking  status: Never    Smokeless tobacco: Never   Substance and Sexual Activity    Alcohol use: Yes     Comment: 1-2 weekly    Drug use: Yes     Types: Marijuana     Comment: rare marijuana  smoking    Sexual activity: Not Currently   Other Topics Concern    Not on file   Social History Narrative    Not on file     Social Determinants of Health     Financial Resource Strain: Not on file   Food Insecurity: Not on file   Transportation Needs: Not on file   Physical Activity: Not on file   Stress: Not on file   Social Connections: Not on file   Interpersonal Safety: Not on file   Housing Stability: Not on file            Family History:       Family History   Problem Relation Age of Onset    Coronary Artery Disease Father              Coronary Artery Disease Brother              Hypertension Mother         98 years old/passed away at 99 years 2019    Hyperlipidemia Mother         98 years old/98 years old    Osteoporosis Mother         (2) knees, (1) hip/(2) knees, (1) hip    No Known Problems Sister     Osteoarthritis Sister             Medications:     Current Outpatient Medications   Medication Sig Dispense Refill    Acetaminophen (TYLENOL PO) Take 650-1,300 mg by mouth daily as needed       albuterol (PROVENTIL HFA) 108 (90 Base) MCG/ACT inhaler Inhale 1-2 puffs into the lungs as needed       Celecoxib (CELEBREX PO) Take 200 mg by mouth every morning       clindamycin (CLEOCIN) 300 MG capsule Take 2 capsules orally by mouth 1 hour prior to dental procedure 4 capsule 0    CREAM BASE EX Externally apply topically as needed (Active Cream)      DULoxetine (CYMBALTA) 20 MG capsule Take 20 mg by mouth every morning       fexofenadine-pseudoePHEDrine (ALLEGRA-D)  MG 12 hr tablet Take 1 tablet by mouth as needed       fluticasone-salmeterol (ADVAIR) 250-50 MCG/DOSE inhaler Inhale 1 puff into the lungs as needed      Ibuprofen (ADVIL PO) Take 400 mg by mouth daily as needed       losartan (COZAAR) 50  MG tablet Take 75 mg by mouth every morning       oxyCODONE (ROXICODONE) 5 MG tablet Take 1-2 tablets (5-10 mg) by mouth every 3 hours as needed 40 tablet 0    senna-docusate (SENOKOT-S/PERICOLACE) 8.6-50 MG tablet Take 2 tablets by mouth 2 times daily 30 tablet 0     No current facility-administered medications for this visit.              Review of Systems:   A comprehensive 10 point review of systems (constitutional, ENT, cardiac, peripheral vascular, lymphatic, respiratory, GI, , Musculoskeletal, skin, Neurological) was performed and found to be negative except as described in this note.       HOOS Hip Dysfunction & Osteoarthritis Outcome Questionnaire         No data to display                       [unfilled]    KOOS Knee Survey Assessment        9/23/2019    10:49 AM   Knee Outcome Survey ADL Scale (Willy, CARLITOS; SAMARA Castro; Maria Guadalupe, RS; Jose Cruz, FH; Laurita, CD; 1998)   Do you have swelling in your knee? Sometimes   Do you feel grinding, hear clicking or any other type of noise when your knee moves? Sometimes   Does your knee catch or hang up when moving? Never   Can you straighten your knee fully? Often   Can you bend your knee fully? Rarely   How severe is your knee joint stiffness after first wakening in the morning? None   How severe is your knee stiffness after sitting, lying or resting LATER IN THE DAY? Mild   How often do you experience knee pain? Weekly   Twisting/pivoting on your knee Mild   Straightening knee fully None   Bending knee fully Mild   Walking on flat surface None   Going up or down stairs None   At night while in bed Mild   Sitting or lying Mild   Standing upright None   Descending stairs Mild   Ascending stairs None   Rising from sitting None   Standing Mild   Bending to floor/ an object Mild   Walking on flat surface None   Getting in/out of car Mild   Going shopping Mild   Putting on socks/stockings Mild   Rising from bed None   Taking off socks/stockings Mild   Lying in  bed (turning over, maintaining knee position) Mild   Getting in/out of bath None   Sitting None   Getting on/off toilet None   Heavy domestic duties (moving heavy boxes, scrubbing floors, etc) Severe   Light domestic duties (cooking, dusting, etc) None   Squatting Severe   Running Extreme   Jumping Extreme   Twisting/pivoting on your injured knee Severe   Kneeling Extreme   How often are you aware of your knee problem? Daily   Have you modified your life style to avoid potentially damaging activities to your knee? Moderately   How much are you troubled with lack of confidence in your knee? Mildly   In general, how much difficulty do you have with your knee? Mild   Pain Score 83.33   Symptoms Score 67.85   Function, Daily Living Score 83.82   Sports and Rec Score 10   Quality of Life Score 56.25              Promis 10 Assessment        9/23/2019    10:43 AM   PROMIS 10   In general, would you say your health is: Fair   In general, would you say your quality of life is: Good   In general, how would you rate your physical health? Fair   In general, how would you rate your mental health, including your mood and your ability to think? Good   In general, how would you rate your satisfaction with your social activities and relationships? Very good   In general, please rate how well you carry out your usual social activities and roles Good   To what extent are you able to carry out your everyday physical activities such as walking, climbing stairs, carrying groceries, or moving a chair? Moderately   In the past 7 days, how often have you been bothered by emotional problems such as feeling anxious, depressed, or irritable? Rarely   In the past 7 days, how would you rate your fatigue on average? Moderate   In the past 7 days, how would you rate your pain on average, where 0 means no pain, and 10 means worst imaginable pain? 4   In general, would you say your health is: 2   In general, would you say your quality of life is: 3    In general, how would you rate your physical health? 2   In general, how would you rate your mental health, including your mood and your ability to think? 3   In general, how would you rate your satisfaction with your social activities and relationships? 4   In general, please rate how well you carry out your usual social activities and roles. (This includes activities at home, at work and in your community, and responsibilities as a parent, child, spouse, employee, friend, etc.) 3   To what extent are you able to carry out your everyday physical activities such as walking, climbing stairs, carrying groceries, or moving a chair? 3   In the past 7 days, how often have you been bothered by emotional problems such as feeling anxious, depressed, or irritable? 2   In the past 7 days, how would you rate your fatigue on average? 3   In the past 7 days, how would you rate your pain on average, where 0 means no pain, and 10 means worst imaginable pain? 4   Global Mental Health Score 14   Global Physical Health Score 11   PROMIS TOTAL - SUBSCORES 25              Ortho [place] Knee Questionnaire        11/4/2017     7:23 PM   [place] Knee Score (  [employer], 1998. All rights reserved) - Problems with knee during last 4 Weeks   1.  How would you describe the pain you usually have from your knee? Moderate   2.  Have you had any trouble with washing and drying yourself (all over) because of your knee? No trouble at all   3.  Have you had any trouble getting in and out of a car or using public transportation because of your knee? (whichever you would tend to use) Very little trouble   4.  For how long have you been able to walk before pain from you knee becomes severe? (with or without cane) No pain/more than 30 minutes   5.  After a meal (sitting at a table), how painful has it been for you to stand up from a chair because of your knee? Not at all painful   6.  Have you been limping when walking because of your  "knee? Rarely/never   7.  Could you kneel down and get up again afterwards? With moderate difficulty   8.  Have you been troubled by pain from your knee in bed at night? Every night   9.  How much has pain from your knee interfered with your usual work (including housework)? A little bit   10. Have you felt that your knee might suddenly \"give out\" or let you down? Sometimes, or just at first   11. Could you do the grocery shopping on your own? Yes easily   12. Could you walk down one flight of stairs? With moderate difficulty   OX[place]ORD TOTAL SCORE 34                See intake form completed by patient    "

## 2025-01-25 ENCOUNTER — HEALTH MAINTENANCE LETTER (OUTPATIENT)
Age: 65
End: 2025-01-25

## 2025-05-14 ENCOUNTER — TELEPHONE (OUTPATIENT)
Dept: ORTHOPEDICS | Facility: CLINIC | Age: 65
End: 2025-05-14
Payer: COMMERCIAL

## 2025-05-14 NOTE — TELEPHONE ENCOUNTER
Other: Patient is wanting to get knee injection, is having more knee pain as time goes on. Patient stated that Dr Wu told patient on last visit that there is not much he can do to help her knee at this point but Dr Wu did say that a knee injection might help. Dr Wus schedule is booked until late July, wondering if patient could get a referral to see someone in sports med to receive injection     Could we send this information to you in [facility] or would you prefer to receive a phone call?:   Patient would prefer a phone call   Okay to leave a detailed message?: Yes at Cell number on file:    Telephone Information:   Mobile [phone]

## 2025-05-15 NOTE — TELEPHONE ENCOUNTER
Patient confirmed scheduled appointment:  Date: 5/22/2025  Time: 3:00pm  Visit type:Return Knee   Provider: Bryce Joya  Location: [facility]

## 2025-05-20 DIAGNOSIS — Z96.652 PAIN DUE TO TOTAL LEFT KNEE REPLACEMENT, SUBSEQUENT ENCOUNTER: Primary | ICD-10-CM

## 2025-05-20 DIAGNOSIS — T84.84XD PAIN DUE TO TOTAL LEFT KNEE REPLACEMENT, SUBSEQUENT ENCOUNTER: Primary | ICD-10-CM

## 2025-05-20 NOTE — PROGRESS NOTES
[facility]  Orthopaedic Surgery, Joint Replacement Consultation  by Iftikhar Wu M.D.    Ashly Guptaford MRN# [id number]    YOB: 1960     Requesting physician: No ref. provider found  Ashly Montano     Background history:  DX:  DJD knees     TREATMENTS:  10/31/16, Left TKA, Dr. KETAN Tran, [facility]  11/12/2008, Right TKA, Dr. KETAN Tran, [facility]  5/17/12, R rotator cuff repair (Armaan Hargrove)   9/10/19, Revision left TKA patellar component (Bryce)  04/2024, R thumb CMC arthroplasty (Esme Willoughby)           Assessment and Plan:   Assessment:  Ashly is a 64-year-old female who has previously been seen for left knee lateral sided joint line pain after undergoing left total hip arthroplasty with subsequent revision for patellar button loosening.  She was last seen by Dr. Wu on 7/8/2024 and at that time it was determined that there was no surgical intervention to offer her that could reliably improve her pain.  At that time it was discussed that if her pain does not resolve we can consider a injection with corticosteroid over the area of tenderness to see if this would improve her symptoms.  Her symptoms are most consistent with scar tissue buildup due to previous lateral naggingly release during her revision thus a lateral retinacular corticosteroid injection with local anesthetic was provided.     Plan:  - Lateral retinacular corticosteroid/local injection provided in clinic today  - Follow-up as needed    Demian Lundberg MD  Orthopaedic Surgery Resident  Pager: [phone]  05/22/25    Attending MD (Dr. Iftikhar Wu) Attestation :  This patient was seen and evaluated by me including a history, exam, and interpretation of all imaging and/or lab data.  I formulated the treatment plan along with either a physician's assistant (STEVE) or training physician (resident/fellow), who also saw the patient. That individual or a scribe has documented the visit in the attached note which  I approve.  I was present for the procedure as well.    MD Jeana Reyes Family Professor  Oncology and Adult Reconstructive Surgery  Dept Orthopaedic Surgery, [facility]  [phone] office, [phone] pager  [email or web address]        For additional information and frequently asked questions regarding joint replacements, scan the QR code image below on your phone camera or go to:  [email or web address]        This note was created using dictation software and may contain errors.  Please contact the creator for any clarifications that are needed.           History of Present Illness:   63 year old female  chief complaint    This patient returns for evaluation of left knee lateral sided joint line pain.  Her pain is particularly bothersome at approximately 60 degrees of flexion and she feels crepitus and snapping over the lateral side of her left knee joint line.  She feels that this is significantly limiting for her and she would like to proceed with injection.  The risks of parasitic infection were discussed and emphasized that intra-articular steroid does place her at high risk for infection, we discussed that although injection is extra-articular there is a added risk to which she was willing to take for pain improvement.    Current symptoms:  Problem Left knee pain. Was told that all her pain would not go away and only now has pain when bearing weight   Onset and duration: Continuous pain from last surgery. Has worsened over the last few months.    Awakens from sleep due to sx's:  No  Precipitating Injury:  No, has gotten new orthotics and they have slightly helped   Other joints or sites painful:  Yes  Prior treatments:  Non steroidal anti-inflammatory agents or aspirin: Yes  Reduced activity:  Yes  Physical therapy:  Yes  Chiropractic care:  No  Injections with either steroid or viscosupplementation agents:  Yes             Physical Exam:      EXAMINATION pertinent findings:   PSYCH: Pleasant, healthy-appearing, alert, oriented x3, cooperative. Normal mood and affect.  VITAL SIGNS: not currently breastfeeding..  Reviewed nursing intake notes.   There is no height or weight on file to calculate BMI.  RESP: non labored breathing   ABD: benign, soft, non-tender, no acute peritoneal findings  SKIN: grossly normal   LYMPHATIC: grossly normal, no adenopathy, no extremity edema  NEURO: grossly normal , no motor deficits  VASCULAR: satisfactory perfusion of all extremities   MUSCULOSKELETAL:   Gait is normal.  Patient is able to reproduce crepitus over the lateral aspect of the joint line between the lateral femoral condyle on patella overlying the lateral patellar retinaculum with squatting.  Range of motion from 0 to 125 degrees of flexion.  Stable to varus valgus stress.  Stable to anterior posterior drawer.  CMS intact distally.         Data:   All laboratory data reviewed  All imaging studies reviewed by me    AP and lateral views of the radiographs are reviewed and compared to prior imaging obtained on 7/9/2024 which demonstrates stable arthroplasty components without evidence of loosening or fracture.  Previous seen inferior patellar pole osteophyte without significant change.      Large Joint Injection/Arthocentesis: L knee joint    Date/Time: 5/22/2025 3:02 PM    Performed by: Iftikhar Wu MD  Authorized by: Iftikhar Wu MD    Indications:  Pain  Needle Size:  21 G  Guidance: landmark guided    Approach:  Anterolateral  Location:  Knee   Location comment:  Left knee lateral retinacular        Medications:  18 mg betamethasone acet & sod phos 6 (3-3) MG/ML; 4 mL lidocaine (PF) 1 %  Outcome:  Tolerated well, no immediate complications  Procedure discussed: discussed risks, benefits, and alternatives    Consent Given by:  Patient  Timeout: timeout called immediately prior to procedure    Prep: patient was prepped and draped in usual sterile  fashion      Total combined visit time and work time before and after clinic visit, independent of trainee, on encounter date = 30 min

## 2025-05-21 ASSESSMENT — KOOS JR
TWISING OR PIVOTING ON KNEE: MILD
GOING UP OR DOWN STAIRS: MODERATE
RISING FROM SITTING: MILD
KOOS JR SCORING: 76.33

## 2025-05-22 ENCOUNTER — OFFICE VISIT (OUTPATIENT)
Dept: ORTHOPEDICS | Facility: CLINIC | Age: 65
End: 2025-05-22
Payer: COMMERCIAL

## 2025-05-22 DIAGNOSIS — M25.562 LEFT LATERAL KNEE PAIN: Primary | ICD-10-CM

## 2025-05-22 RX ORDER — LIDOCAINE HYDROCHLORIDE 10 MG/ML
4 INJECTION, SOLUTION EPIDURAL; INFILTRATION; INTRACAUDAL; PERINEURAL
Status: COMPLETED | OUTPATIENT
Start: 2025-05-22 | End: 2025-05-22

## 2025-05-22 RX ORDER — BETAMETHASONE SODIUM PHOSPHATE AND BETAMETHASONE ACETATE 3; 3 MG/ML; MG/ML
18 INJECTION, SUSPENSION INTRA-ARTICULAR; INTRALESIONAL; INTRAMUSCULAR; SOFT TISSUE
Status: COMPLETED | OUTPATIENT
Start: 2025-05-22 | End: 2025-05-22

## 2025-05-22 RX ADMIN — BETAMETHASONE SODIUM PHOSPHATE AND BETAMETHASONE ACETATE 18 MG: 3; 3 INJECTION, SUSPENSION INTRA-ARTICULAR; INTRALESIONAL; INTRAMUSCULAR; SOFT TISSUE at 15:02

## 2025-05-22 RX ADMIN — LIDOCAINE HYDROCHLORIDE 4 ML: 10 INJECTION, SOLUTION EPIDURAL; INFILTRATION; INTRACAUDAL; PERINEURAL at 15:02

## 2025-05-22 NOTE — LETTER
5/22/2025      Ashly Paulino  [address]  [place] [address]      Dear Colleague,    Thank you for referring your patient, Ashly Paulino, to the [facility] [place]. Please see a copy of my visit note below.        [facility]  Orthopaedic Surgery, Joint Replacement Consultation  by Iftikhar Wu M.D.    Ashly Paulino MRN# [id number]    YOB: 1960     Requesting physician: No ref. provider found  Ashly Montano     Background history:  DX:  DJD knees     TREATMENTS:  10/31/16, Left TKA, Dr. KETAN Tran, AUGUSTINE Malave  11/12/2008, Right TKA, Dr. KETAN Tran, [facility]  5/17/12, R rotator cuff repair (Armaan Hargrove)   9/10/19, Revision left TKA patellar component (Bryce)  04/2024, R thumb CMC arthroplasty (Esme Willoughby)           Assessment and Plan:   Assessment:  Ashly is a 64-year-old female who has previously been seen for left knee lateral sided joint line pain after undergoing left total hip arthroplasty with subsequent revision for patellar button loosening.  She was last seen by Dr. Wu on 7/8/2024 and at that time it was determined that there was no surgical intervention to offer her that could reliably improve her pain.  At that time it was discussed that if her pain does not resolve we can consider a injection with corticosteroid over the area of tenderness to see if this would improve her symptoms.  Her symptoms are most consistent with scar tissue buildup due to previous lateral naggingly release during her revision thus a lateral retinacular corticosteroid injection with local anesthetic was provided.     Plan:  - Lateral retinacular corticosteroid/local injection provided in clinic today  - Follow-up as needed    Demian Lundberg MD  Orthopaedic Surgery Resident  Pager: [phone]  05/22/25    Attending MD (Dr. Iftikhar Wu) Attestation :  This patient was seen and evaluated by me including a history, exam, and  interpretation of all imaging and/or lab data.  I formulated the treatment plan along with either a physician's assistant (STEVE) or training physician (resident/fellow), who also saw the patient. That individual or a scribe has documented the visit in the attached note which I approve.  I was present for the procedure as well.    MD Jeana Reyes Family Professor  Oncology and Adult Reconstructive Surgery  Dept Orthopaedic Surgery, [facility]  [phone] office, [phone] pager  [email or web address]        For additional information and frequently asked questions regarding joint replacements, scan the QR code image below on your phone camera or go to:  [email or web address]        This note was created using dictation software and may contain errors.  Please contact the creator for any clarifications that are needed.           History of Present Illness:   63 year old female  chief complaint    This patient returns for evaluation of left knee lateral sided joint line pain.  Her pain is particularly bothersome at approximately 60 degrees of flexion and she feels crepitus and snapping over the lateral side of her left knee joint line.  She feels that this is significantly limiting for her and she would like to proceed with injection.  The risks of parasitic infection were discussed and emphasized that intra-articular steroid does place her at high risk for infection, we discussed that although injection is extra-articular there is a added risk to which she was willing to take for pain improvement.    Current symptoms:  Problem Left knee pain. Was told that all her pain would not go away and only now has pain when bearing weight   Onset and duration: Continuous pain from last surgery. Has worsened over the last few months.    Awakens from sleep due to sx's:  No  Precipitating Injury:  No, has gotten new orthotics and they have slightly helped   Other  joints or sites painful:  Yes  Prior treatments:  Non steroidal anti-inflammatory agents or aspirin: Yes  Reduced activity:  Yes  Physical therapy:  Yes  Chiropractic care:  No  Injections with either steroid or viscosupplementation agents:  Yes             Physical Exam:     EXAMINATION pertinent findings:   PSYCH: Pleasant, healthy-appearing, alert, oriented x3, cooperative. Normal mood and affect.  VITAL SIGNS: not currently breastfeeding..  Reviewed nursing intake notes.   There is no height or weight on file to calculate BMI.  RESP: non labored breathing   ABD: benign, soft, non-tender, no acute peritoneal findings  SKIN: grossly normal   LYMPHATIC: grossly normal, no adenopathy, no extremity edema  NEURO: grossly normal , no motor deficits  VASCULAR: satisfactory perfusion of all extremities   MUSCULOSKELETAL:   Gait is normal.  Patient is able to reproduce crepitus over the lateral aspect of the joint line between the lateral femoral condyle on patella overlying the lateral patellar retinaculum with squatting.  Range of motion from 0 to 125 degrees of flexion.  Stable to varus valgus stress.  Stable to anterior posterior drawer.  CMS intact distally.         Data:   All laboratory data reviewed  All imaging studies reviewed by me    AP and lateral views of the radiographs are reviewed and compared to prior imaging obtained on 7/9/2024 which demonstrates stable arthroplasty components without evidence of loosening or fracture.  Previous seen inferior patellar pole osteophyte without significant change.      Large Joint Injection/Arthocentesis: L knee joint    Date/Time: 5/22/2025 3:02 PM    Performed by: Iftikhar Wu MD  Authorized by: Iftikhar Wu MD    Indications:  Pain  Needle Size:  21 G  Guidance: landmark guided    Approach:  Anterolateral  Location:  Knee   Location comment:  Left knee lateral retinacular        Medications:  18 mg betamethasone acet & sod phos 6 (3-3) MG/ML; 4 mL lidocaine  (PF) 1 %  Outcome:  Tolerated well, no immediate complications  Procedure discussed: discussed risks, benefits, and alternatives    Consent Given by:  Patient  Timeout: timeout called immediately prior to procedure    Prep: patient was prepped and draped in usual sterile fashion      Total combined visit time and work time before and after clinic visit, independent of trainee, on encounter date = 30 min        Again, thank you for allowing me to participate in the care of your patient.        Sincerely,        Iftikhar Wu MD    Electronically signed

## 2025-05-22 NOTE — NURSING NOTE
[facility] [address] [address]  [phone]  Dept: [phone]  ______________________________________________________________________________    Patient: Ashly Paulino   : 1960   MRN: [id number]   May 22, 2025    INVASIVE PROCEDURE SAFETY CHECKLIST    Date: 2025   Procedure:Left knee lateral retinacular steroid injection  Patient Name: Ashly Paulino  MRN: [id number]  YOB: 1960    Action: Complete sections as appropriate. Any discrepancy results in a HARD COPY until resolved.     PRE PROCEDURE:  Patient ID verified with 2 identifiers (name and  or MRN): Yes  Procedure and site verified with patient/designee (when able): Yes  Accurate consent documentation in medical record: Yes  H&P (or appropriate assessment) documented in medical record: NA  H&P must be up to 20 days prior to procedure and updates within 24 hours of procedure as applicable: NA  Relevant diagnostic and radiology test results appropriately labeled and displayed as applicable: Yes  Procedure site(s) marked with provider initials: NA    TIMEOUT:  Time-Out performed immediately prior to starting procedure, including verbal and active participation of all team members addressing the following:Yes  * Correct patient identify  * Confirmed that the correct side and site are marked  * An accurate procedure consent form  * Agreement on the procedure to be done  * Correct patient position  * Relevant images and results are properly labeled and appropriately displayed  * The need to administer antibiotics or fluids for irrigation purposes during the procedure as applicable   * Safety precautions based on patient history or medication use    DURING PROCEDURE: Verification of correct person, site, and procedures any time the responsibility for care of the patient is transferred to another member of the care team.       The following  medications were given:         Prior to injection, verified patient identity using patient's name and date of birth.  Due to injection administration, patient instructed to remain in clinic for 15 minutes  afterwards, and to report any adverse reaction to me immediately.    Injection   Medication Name: Lidocaine NDC 91214-958-95   Drug Amount Wasted:  Yes: 1 mg/ml   Vial/Syringe: Single dose vial  Expiration Date:  10/2028    Medication Name: Celestone NDC 3477-5646-14  Drug Amount Wasted:  Yes: 2 mg/ml   Vial/Syringe: Single dose vial  Expiration Date:  01/31/2026    Scribed by Jeri Taylor ATC for Dr. Wu on May 22, 2025 at 3:10p based on the provider's statements to me.     Jeri Taylor ATC

## (undated) DEVICE — GLOVE PROTEXIS W/NEU-THERA 8.0  2D73TE80

## (undated) DEVICE — SU VICRYL 0 CT-1 36" J946H

## (undated) DEVICE — SUCTION MANIFOLD DORNOCH ULTRA CART UL-CL500

## (undated) DEVICE — SOL NACL 0.9% IRRIG 1000ML BOTTLE 2F7124

## (undated) DEVICE — DRAPE STOCKINETTE 8" 8586

## (undated) DEVICE — PREP DURAPREP 26ML APL 8630

## (undated) DEVICE — IMP COMP PATELLA GENESIS II 9X35MM 71420578: Type: IMPLANTABLE DEVICE | Site: KNEE | Status: NON-FUNCTIONAL

## (undated) DEVICE — MIDAS REX DISSECTING TOOL 4MM 9BA40

## (undated) DEVICE — ESU PENCIL W/HOLSTER E2350H

## (undated) DEVICE — BONE CEMENT MIXEVAC III HI VAC KIT  0206-015-000

## (undated) DEVICE — PREP SKIN SCRUB TRAY 4461A

## (undated) DEVICE — MIDAS REX DISSECTING TOOL 9OV40L

## (undated) DEVICE — SUCTION IRR SYSTEM W/O TIP INTERPULSE HANDPIECE 0210-100-000

## (undated) DEVICE — LINEN TOWEL PACK X5 5464

## (undated) DEVICE — LINEN BACK PACK 5440

## (undated) DEVICE — GLOVE PROTEXIS W/NEU-THERA 7.0  2D73TE70

## (undated) DEVICE — BLADE KNIFE SURG 15 371115

## (undated) DEVICE — WIPES FOLEY CARE SURESTEP PROVON DFC100

## (undated) DEVICE — GLOVE PROTEXIS POWDER FREE 8.5 ORTHOPEDIC 2D73ET85

## (undated) DEVICE — TAPE DURAPORE ADVANCED PURPLE 3" 1590-3

## (undated) DEVICE — LINEN GOWN X4 5410

## (undated) DEVICE — BAG BIOHAZARD SPECIMEN 9X6" ORANGE/WHITE SBL2X69B

## (undated) DEVICE — DRSG STERI STRIP 1/2X4" R1547

## (undated) DEVICE — GLOVE PROTEXIS BLUE W/NEU-THERA 8.0  2D73EB80

## (undated) DEVICE — SPONGE LAP 18X18" X8435

## (undated) DEVICE — SU SILK 2-0 SH 30" K833H

## (undated) DEVICE — SOL WATER IRRIG 1000ML BOTTLE 2F7114

## (undated) DEVICE — GOWN IMPERVIOUS SPECIALTY XLG/XLONG 32474

## (undated) DEVICE — SU ETHIBOND 1 CTX CR 8/18" CX30D

## (undated) DEVICE — SU PDS II 3-0 PS-1 18" Z683G

## (undated) DEVICE — BONE CLEANING TIP INTERPULSE  0210-010-000

## (undated) DEVICE — BASIN SET MAJOR

## (undated) DEVICE — DRAPE CONVERTORS U-DRAPE 60X72" 8476

## (undated) DEVICE — GLOVE PROTEXIS BLUE W/NEU-THERA 7.5  2D73EB75

## (undated) DEVICE — BAG TRANSPORT RED LINE RECLOSABLE 15X12" MGRL2P1215

## (undated) DEVICE — CATH TRAY FOLEY SURESTEP 16FR WDRAIN BAG STLK LATEX A300316A

## (undated) DEVICE — PREP POVIDONE IODINE SCRUB 7.5% 120ML

## (undated) DEVICE — STRAP KNEE/BODY 31143004

## (undated) DEVICE — SU VICRYL 2-0 CT-1 27" UND J259H

## (undated) DEVICE — SYR 05ML LL W/O NDL

## (undated) DEVICE — LINEN GOWN OVERSIZE 5408

## (undated) DEVICE — CAST PADDING 6" STERILE 9046S

## (undated) DEVICE — BLADE SAW SAGITTAL STRK 25X79.5X1.24MM 4/2000 2108-318-000

## (undated) DEVICE — Device

## (undated) DEVICE — KIT CULTURE TRANSPORT SYS A.C.T. II DUAL ANEROBE R124022

## (undated) DEVICE — DRAPE STERI TOWEL LG 1010

## (undated) DEVICE — SPECIMEN CONTAINER 5OZ STERILE 2600SA

## (undated) RX ORDER — FENTANYL CITRATE 50 UG/ML
INJECTION, SOLUTION INTRAMUSCULAR; INTRAVENOUS
Status: DISPENSED
Start: 2019-09-10

## (undated) RX ORDER — ACETAMINOPHEN 325 MG/1
TABLET ORAL
Status: DISPENSED
Start: 2019-09-10

## (undated) RX ORDER — CLINDAMYCIN PHOSPHATE 900 MG/50ML
INJECTION, SOLUTION INTRAVENOUS
Status: DISPENSED
Start: 2019-09-10

## (undated) RX ORDER — ONDANSETRON 2 MG/ML
INJECTION INTRAMUSCULAR; INTRAVENOUS
Status: DISPENSED
Start: 2019-09-10

## (undated) RX ORDER — SCOLOPAMINE TRANSDERMAL SYSTEM 1 MG/1
PATCH, EXTENDED RELEASE TRANSDERMAL
Status: DISPENSED
Start: 2019-09-10

## (undated) RX ORDER — BETAMETHASONE SODIUM PHOSPHATE AND BETAMETHASONE ACETATE 3; 3 MG/ML; MG/ML
INJECTION, SUSPENSION INTRA-ARTICULAR; INTRALESIONAL; INTRAMUSCULAR; SOFT TISSUE
Status: DISPENSED
Start: 2025-05-22

## (undated) RX ORDER — PROPOFOL 10 MG/ML
INJECTION, EMULSION INTRAVENOUS
Status: DISPENSED
Start: 2019-09-10

## (undated) RX ORDER — OXYCODONE HYDROCHLORIDE 5 MG/1
TABLET ORAL
Status: DISPENSED
Start: 2019-09-10

## (undated) RX ORDER — IPRATROPIUM BROMIDE AND ALBUTEROL SULFATE 2.5; .5 MG/3ML; MG/3ML
SOLUTION RESPIRATORY (INHALATION)
Status: DISPENSED
Start: 2019-09-10

## (undated) RX ORDER — PHENYLEPHRINE HCL IN 0.9% NACL 1 MG/10 ML
SYRINGE (ML) INTRAVENOUS
Status: DISPENSED
Start: 2019-09-10

## (undated) RX ORDER — LIDOCAINE HYDROCHLORIDE 10 MG/ML
INJECTION, SOLUTION EPIDURAL; INFILTRATION; INTRACAUDAL; PERINEURAL
Status: DISPENSED
Start: 2025-05-22